# Patient Record
Sex: FEMALE | Race: WHITE | HISPANIC OR LATINO | Employment: OTHER | URBAN - METROPOLITAN AREA
[De-identification: names, ages, dates, MRNs, and addresses within clinical notes are randomized per-mention and may not be internally consistent; named-entity substitution may affect disease eponyms.]

---

## 2017-07-26 ENCOUNTER — ALLSCRIPTS OFFICE VISIT (OUTPATIENT)
Dept: OTHER | Facility: OTHER | Age: 72
End: 2017-07-26

## 2017-10-04 ENCOUNTER — ALLSCRIPTS OFFICE VISIT (OUTPATIENT)
Dept: OTHER | Facility: OTHER | Age: 72
End: 2017-10-04

## 2017-10-05 NOTE — PROGRESS NOTES
Assessment  1  Asymptomatic menopausal state (V49 81) (Z78 0)   2  Diabetes mellitus with neuropathy (250 60,357 2) (E11 40)   3  Callus (700) (L84)    Plan   · Follow-up PRN Evaluation and Treatment  Follow-up  Status: Complete  Done:  06LJG8278 03:08PM   · Follow-up visit in 9 weeks Evaluation and Treatment  Follow-up  Status: Hold For -  Scheduling  Requested for: 33SJF1673   · *VB - Foot Exam; Status:Complete;   Done: 89NGS4887 03:08PM    Discussion/Summary  The patient was counseled regarding instructions for management,-patient and family education,-importance of compliance with treatment  Patient is able to Self-Care  The treatment plan was reviewed with the patient/guardian  The patient/guardian understands and agrees with the treatment plan      Chief Complaint  FEET CARE      History of Present Illness  HPI: 69-year-old female with diabetes mellitus, is controlled with metformin and insulin, she complains of pain in her toes  Today  She has pain with ambulation and shoe wear  there is no history of trauma      Active Problems  1  Asymptomatic menopausal state (V49 81) (Z78 0)   2  Benign essential hypertension (401 1) (I10)   3  Callus (700) (L84)   4  Deformity of ankle and foot, acquired (736 70) (M21 969)   5  Diabetes mellitus with neuropathy (250 60,357 2) (E11 40)   6  Diabetic Peripheral Neuropathy (357 2)   7  Disc degeneration, lumbosacral (722 52) (M51 37)   8  Insomnia (780 52) (G47 00)   9  Limb pain (729 5) (M79 609)   10  Lumbago (724 2) (M54 5)   11  Lumbar canal stenosis (724 02) (M48 061)   12  Ovarian cyst (620 2) (N83 20)   13  Pes planus, unspecified laterality (734) (M21 40)   14  Prepatellar bursitis, unspecified laterality (726 65) (M70 40)   15  Routine history and physical examination of adult (V70 0) (Z00 00)   16  Type 2 diabetes mellitus (250 00) (E11 9)   17   Well adult on routine health check (V70 0) (Z00 00)    Social History   · Former smoker (I45 23) (P63 142)    Current Meds   1  Accu-Chek FastClix Lancets Miscellaneous; Therapy: 92Mea7569 to Recorded   2  Aspirin 81 MG Oral Tablet Chewable; Therapy: (Recorded:37Wpd2585) to Recorded   3  Aspirin 81 MG TBDP; 1 Every Day; Therapy: 78VCN2639 to  Requested for: 32HHN8887 Recorded   4  CeleBREX 200 MG Oral Capsule; 2 Every Morning; Therapy: 83VQR9370 to  Requested for: 86DZF8572 Recorded   5  HumuLIN 70/30 Pen (70-30) 100 UNIT/ML SUSP; Therapy: (Recorded:27Swf9735) to Recorded   6  HumuLIN N Pen 100 UNIT/ML SUPN;   Therapy: 13DWK4454 to Recorded   7  Ketorolac Tromethamine 0 4 % Ophthalmic Solution; Therapy: 01Apr2014 to Recorded   8  Latanoprost 0 005 % Ophthalmic Solution; Therapy: (Recorded:79Kfb2988) to Recorded   9  Lisinopril 10 MG Oral Tablet; 1 Every Day; Therapy: 18WLI2137 to  Requested for: 70WQA5349 Recorded   10  Lisinopril 5 MG Oral Tablet; Therapy: (Recorded:91Ton6382) to Recorded   11  Loratadine 10 MG Oral Tablet; 1 Every Day; Therapy: 39GGO2800 to  Requested for: 46DCA7508 Recorded   12  Lumigan 0 01 % Ophthalmic Solution; Therapy: 76OBJ2671 to Recorded   13  MetFORMIN HCl - 500 MG Oral Tablet; 2 Two Times A Day; Therapy: 30CLN7497 to  Requested for: 71RTV1231 Recorded   14  MetFORMIN HCl - 500 MG Oral Tablet; Therapy: (Recorded:04Bql4832) to Recorded   15  Metoprolol Tartrate 25 MG Oral Tablet; Therapy: (Recorded:67Vvt3969) to Recorded   16  Nabumetone 500 MG Oral Tablet; Therapy: (Recorded:82Cfc0304) to Recorded   17  Ofloxacin 0 3 % Ophthalmic Solution; Therapy: 01Apr2014 to Recorded   18  Omeprazole 20 MG Oral Capsule Delayed Release; Therapy: (Recorded:34Vuz6612) to Recorded   19  Prandin 1 MG Oral Tablet; 3 tabs qac tid; Therapy: 88FAL0145 to  Requested for: 08FVC0873 Recorded   20  Prandin 1 MG Oral Tablet; Therapy: (Recorded:67Pxl7536) to Recorded   21  PrednisoLONE Acetate 1 % Ophthalmic Suspension; Therapy: 01Apr2014 to Recorded   22   Simvastatin 10 MG Oral Tablet; Therapy: (Recorded:09Fgg3338) to Recorded   23  Simvastatin 40 MG Oral Tablet; 1/2 tab daily; Therapy: 36ODC8242 to  Requested for: 32PEL3943 Recorded   24  Toprol XL 25 MG Oral Tablet Extended Release 24 Hour; 1 Every Day; Therapy: 77BCD2749 to  Requested for: 34JLU8882 Recorded   25  Tradjenta 5 MG Oral Tablet; Therapy: (Recorded:05Joi4315) to Recorded   26  Xalatan 0 005 % Ophthalmic Solution; as directed; Therapy: 19FPF9526 to  Requested for: 34YZV8148 Recorded   27  Zolpidem Tartrate 10 MG Oral Tablet; 1 Every Day At Bedtime, As Needed; Therapy: 80LYX3718 to  Requested for: 59HTM2438 Recorded    Allergies  1  No Known Drug Allergies    Vitals   Recorded: 23VEY2653 02:55PM   Respiration 16   Systolic 961   Diastolic 67   Height 5 ft 3 in   Weight 120 lb    BMI Calculated 21 26   BSA Calculated 1 56     Physical Exam  Left Foot: Appearance: Normal except as noted: excessive pronation-and-pes planus  Great toe deformities include a bunion  Second toe deformities include hammer toe  Third toe deformities include hammer toe  Tenderness: None except the great toe-and-distal first metatarsal    Right Foot: Appearance: Normal except as noted: excessive pronation-and-pes planus  Second toe deformities include hammer toe  Third toe deformities include hammer toe  Tenderness: None except the great toe-and-distal first metatarsal    Left Ankle: ROM: limited ROM in all planes Motor: Normal    Right Ankle: ROM: limited ROM in all planes Motor: Normal    Neurological Exam: performed  Light touch was absent bilaterally  Vibratory sensation was absent bilaterally  Vascular Exam: performed Dorsalis pedis pulses were One/4 bilaterally  Posterior tibial pulses were One/4 bilaterally  Elevation Pallor: diminished bilaterally  Capillary refill time was Q  9, findings bilateral  Negative digital hair noted  Abnormal cooling noted, but-between 1-3 seconds bilaterally  Toenails:  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  Socks and shoes removed, Right Foot Findings: swollen, erythematous and dry  The sensory exam showed diminished vibratory sensation at the level of the toes  Diminished tactile sensation with monofilament testing throughout the right foot  Socks and shoes removed, Left Foot Findings: swollen, erythematous and dry  The sensory exam showed diminished vibratory sensation at the level of the toes  Diminished tactile sensation with monofilament testing throughout the left foot  Hyperkeratosis: present on both first toes-and-present on both first sub metatarsals  Shoe Gear Evaluation: performed ()  Right Foot: width: D -and-length: 8 5  Left Foot: width: D -and-length: A  point by  Recommendation(s): custom inlays  Procedure      Procedure: Debridement of hyperkeratosis  The procedure's risks were discussed with the patient  Procedure Note:  Anesthesia:   The area for debridement was located on the medial aspect of the Hallux pinch callus, bilateral distal phalanx  Debridement was performed on 4, partial thickness hyperkeratotic area(s)  Post-Procedure: the patient tolerated the procedure well  Complications: there were no complications  All mycotic nails debrided  Bilateral pre-ulcerative lesions debrided as well        Signatures   Electronically signed by : Ac Stone DPM; Oct  4 2017  3:08PM EST                       (Author)

## 2017-12-13 ENCOUNTER — ALLSCRIPTS OFFICE VISIT (OUTPATIENT)
Dept: OTHER | Facility: OTHER | Age: 72
End: 2017-12-13

## 2017-12-15 NOTE — PROGRESS NOTES
Assessment  1  Callus (700) (L84)   2  Diabetes mellitus with neuropathy (250 60,357 2) (E11 40)   3  Type 2 diabetes mellitus (250 00) (E11 9)   4  Diabetic neuropathy (250 60,357 2) (E11 40)    Plan   · Follow-up visit in 9 weeks Evaluation and Treatment  Follow-up  Status: Hold For -Scheduling  Requested for: 38Ydj4135   · *VB - Foot Exam; Status:Complete;   Done: 38PDE7879 01:35PM   · Diabetes Foot Exam Performed; Status:Complete;   Done: 51EEH9073 01:35PM   · Inspect your feet and legs daily if you have vascular disease ; Status:Complete;   Done:31Vxa4104 01:35PM   · Inspect your feet daily ; Status:Complete;   Done: 16FKL2059 01:35PM   · It is important to take good care of your feet if you have diabetes ; Status:Complete;  Done: 80TLW3395 01:35PM   · There are many things you can do at home to ensure good foot health ; Status:Complete;  Done: 73GFQ9720 01:35PM   · Wear shoes that give your toes plenty of room ; Status:Complete;   Done: 18Tzc462092:35PM    Discussion/Summary  The patient was counseled regarding instructions for management,-- patient and family education,-- importance of compliance with treatment  Patient is able to Self-Care  The treatment plan was reviewed with the patient/guardian  The patient/guardian understands and agrees with the treatment plan      Chief Complaint  FEET CARE      History of Present Illness  HPI: 80-year-old female with diabetes mellitus, is controlled with metformin and insulin, she complains of pain in her toes  Today  She has pain with ambulation and shoe wear  there is no history of trauma      Active Problems  1  Asymptomatic menopausal state (V49 81) (Z78 0)   2  Benign essential hypertension (401 1) (I10)   3  Callus (700) (L84)   4  Deformity of ankle and foot, acquired (736 70) (M21 969)   5  Diabetes mellitus with neuropathy (250 60,357 2) (E11 40)   6  Diabetic neuropathy (250 60,357 2) (E11 40)   7  Disc degeneration, lumbosacral (722 52) (M51 37)   8  Insomnia (780 52) (G47 00)   9  Limb pain (729 5) (M79 609)   10  Lumbago (724 2) (M54 5)   11  Lumbar canal stenosis (724 02) (M48 061)   12  Ovarian cyst (620 2) (N83 20)   13  Pes planus, unspecified laterality (734) (M21 40)   14  Prepatellar bursitis, unspecified laterality (726 65) (M70 40)   15  Routine history and physical examination of adult (V70 0) (Z00 00)   16  Type 2 diabetes mellitus (250 00) (E11 9)   17  Well adult on routine health check (V70 0) (Z00 00)    Social History   · Former smoker (Q69 95) (M83 413)    Current Meds   1  Accu-Chek FastClix Lancets Miscellaneous; Therapy: 46Cks1695 to Recorded   2  Aspirin 81 MG Oral Tablet Chewable; Therapy: (Recorded:26Byc2834) to Recorded   3  Aspirin 81 MG TBDP; 1 Every Day; Therapy: 79IZE6809 to  Requested for: 89OKV7631 Recorded   4  CeleBREX 200 MG Oral Capsule; 2 Every Morning; Therapy: 61ECL8849 to  Requested for: 18LYL5432 Recorded   5  HumuLIN 70/30 Pen (70-30) 100 UNIT/ML SUSP; Therapy: (Recorded:11Ktm8867) to Recorded   6  HumuLIN N Pen 100 UNIT/ML SUPN; Therapy: 79NUA8307 to Recorded   7  Ketorolac Tromethamine 0 4 % Ophthalmic Solution; Therapy: 84Vqg0431 to Recorded   8  Latanoprost 0 005 % Ophthalmic Solution; Therapy: (Recorded:01Rce1669) to Recorded   9  Lisinopril 10 MG Oral Tablet; 1 Every Day; Therapy: 69EQE4698 to  Requested for: 23DSY5992 Recorded   10  Lisinopril 5 MG Oral Tablet; Therapy: (Recorded:94Vdf1281) to Recorded   11  Loratadine 10 MG Oral Tablet; 1 Every Day; Therapy: 80SQI9889 to  Requested for: 67DGR0860 Recorded   12  Lumigan 0 01 % Ophthalmic Solution; Therapy: 53SFB5226 to Recorded   13  MetFORMIN HCl - 500 MG Oral Tablet; 2 Two Times A Day; Therapy: 08PEK2795 to  Requested for: 15AWO7077 Recorded   14  MetFORMIN HCl - 500 MG Oral Tablet; Therapy: (Recorded:30Aug2013) to Recorded   15  Metoprolol Tartrate 25 MG Oral Tablet; Therapy: (Recorded:30Aug2013) to Recorded   16  Nabumetone 500 MG Oral Tablet;   Therapy: (Recorded:41Xjm4909) to Recorded   17  Ofloxacin 0 3 % Ophthalmic Solution; Therapy: 01Apr2014 to Recorded   18  Omeprazole 20 MG Oral Capsule Delayed Release; Therapy: (Recorded:17Jcu0186) to Recorded   19  Prandin 1 MG Oral Tablet; 3 tabs qac tid; Therapy: 46VVF8016 to  Requested for: 11ENA6858 Recorded   20  Prandin 1 MG Oral Tablet; Therapy: (Recorded:15Ixv0769) to Recorded   21  PrednisoLONE Acetate 1 % Ophthalmic Suspension; Therapy: 01Apr2014 to Recorded   22  Simvastatin 10 MG Oral Tablet; Therapy: (Recorded:31Tld2838) to Recorded   23  Simvastatin 40 MG Oral Tablet; 1/2 tab daily; Therapy: 21MAF3565 to  Requested for: 55ZAH5320 Recorded   24  Toprol XL 25 MG Oral Tablet Extended Release 24 Hour; 1 Every Day; Therapy: 56UKX5772 to  Requested for: 31UJE8745 Recorded   25  Tradjenta 5 MG Oral Tablet; Therapy: (Recorded:00Bdz9193) to Recorded   26  Xalatan 0 005 % Ophthalmic Solution; as directed; Therapy: 44PHS9707 to  Requested for: 30NBZ4675 Recorded   27  Zolpidem Tartrate 10 MG Oral Tablet; 1 Every Day At Bedtime, As Needed; Therapy: 07QCK6061 to  Requested for: 21ZZI9197 Recorded    Allergies  1  No Known Drug Allergies    Vitals   Recorded: 46Ftz8857 01:17PM   Height 5 ft 3 in   Weight 120 lb    BMI Calculated 21 26   BSA Calculated 1 56     Physical Exam  Left Foot: Appearance: Normal except as noted: excessive pronation-- and-- pes planus  Great toe deformities include a bunion  Second toe deformities include hammer toe  Third toe deformities include hammer toe  Tenderness: None except the great toe-- and-- distal first metatarsal    Right Foot: Appearance: Normal except as noted: excessive pronation-- and-- pes planus  Second toe deformities include hammer toe  Third toe deformities include hammer toe   Tenderness: None except the great toe-- and-- distal first metatarsal    Left Ankle: ROM: limited ROM in all planes Motor: Normal    Right Ankle: ROM: limited ROM in all planes Motor: Normal  Neurological Exam: performed  Light touch was absent bilaterally  Vibratory sensation was absent bilaterally  Vascular Exam: performed Dorsalis pedis pulses were One/4 bilaterally  Posterior tibial pulses were One/4 bilaterally  Elevation Pallor: diminished bilaterally  Capillary refill time was Q  9, findings bilateral  Negative digital hair noted  Abnormal cooling noted, but-- between 1-3 seconds bilaterally  Toenails: 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  Socks and shoes removed, Right Foot Findings: swollen, erythematous and dry  The sensory exam showed diminished vibratory sensation at the level of the toes  Diminished tactile sensation with monofilament testing throughout the right foot  Socks and shoes removed, Left Foot Findings: swollen, erythematous and dry  The sensory exam showed diminished vibratory sensation at the level of the toes  Diminished tactile sensation with monofilament testing throughout the left foot  Hyperkeratosis: present on both first toes-- and-- present on both first sub metatarsals  Shoe Gear Evaluation: performed ()  Right Foot: width: D -- and-- length: 8 5  Left Foot: width: D -- and-- length: A  point by  Recommendation(s): custom inlays  Procedure     Procedure: Debridement of hyperkeratosis  The procedure's risks were discussed with the patient  Procedure Note: Anesthesia:  The area for debridement was located on the medial aspect of the Hallux pinch callus, bilateral distal phalanx  Debridement was performed on 4, partial thickness hyperkeratotic area(s)  Post-Procedure: the patient tolerated the procedure well  Complications: there were no complications  All mycotic nails debrided  Bilateral pre-ulcerative lesions debrided as well        Signatures   Electronically signed by : Deann Bloch, DPM; Dec 13 2017  1:35PM EST                       (Author)

## 2018-01-13 VITALS
HEIGHT: 63 IN | RESPIRATION RATE: 16 BRPM | WEIGHT: 120 LBS | BODY MASS INDEX: 21.26 KG/M2 | SYSTOLIC BLOOD PRESSURE: 132 MMHG | DIASTOLIC BLOOD PRESSURE: 67 MMHG

## 2018-01-14 VITALS
DIASTOLIC BLOOD PRESSURE: 63 MMHG | RESPIRATION RATE: 16 BRPM | WEIGHT: 130 LBS | HEIGHT: 63 IN | HEART RATE: 76 BPM | BODY MASS INDEX: 23.04 KG/M2 | SYSTOLIC BLOOD PRESSURE: 131 MMHG

## 2018-01-22 VITALS — WEIGHT: 120 LBS | HEIGHT: 63 IN | BODY MASS INDEX: 21.26 KG/M2

## 2018-10-16 ENCOUNTER — TRANSCRIBE ORDERS (OUTPATIENT)
Dept: ADMINISTRATIVE | Facility: HOSPITAL | Age: 73
End: 2018-10-16

## 2018-10-16 DIAGNOSIS — R09.89 ABNORMAL CHEST SOUNDS: Primary | ICD-10-CM

## 2018-10-22 ENCOUNTER — HOSPITAL ENCOUNTER (OUTPATIENT)
Dept: RADIOLOGY | Facility: HOSPITAL | Age: 73
Discharge: HOME | End: 2018-10-22
Payer: MEDICARE

## 2018-10-22 DIAGNOSIS — R09.89 ABNORMAL CHEST SOUNDS: ICD-10-CM

## 2018-10-22 PROCEDURE — 93880 EXTRACRANIAL BILAT STUDY: CPT

## 2018-10-23 PROCEDURE — 93880 EXTRACRANIAL BILAT STUDY: CPT | Performed by: SURGERY

## 2018-10-31 ENCOUNTER — OFFICE VISIT (OUTPATIENT)
Dept: VASCULAR SURGERY | Facility: CLINIC | Age: 73
End: 2018-10-31
Payer: MEDICARE

## 2018-10-31 VITALS
RESPIRATION RATE: 18 BRPM | TEMPERATURE: 97.7 F | HEIGHT: 63 IN | DIASTOLIC BLOOD PRESSURE: 64 MMHG | WEIGHT: 140 LBS | SYSTOLIC BLOOD PRESSURE: 126 MMHG | HEART RATE: 78 BPM | BODY MASS INDEX: 24.8 KG/M2

## 2018-10-31 DIAGNOSIS — I65.23 CAROTID ARTERY STENOSIS, ASYMPTOMATIC, BILATERAL: Primary | ICD-10-CM

## 2018-10-31 DIAGNOSIS — R42 DIZZINESS: ICD-10-CM

## 2018-10-31 PROCEDURE — 99204 OFFICE O/P NEW MOD 45 MIN: CPT | Performed by: PHYSICIAN ASSISTANT

## 2018-10-31 RX ORDER — GLIPIZIDE 5 MG/1
TABLET ORAL
COMMUNITY
End: 2019-06-03

## 2018-10-31 RX ORDER — LATANOPROST 50 UG/ML
SOLUTION/ DROPS OPHTHALMIC
COMMUNITY
End: 2018-10-31 | Stop reason: ALTCHOICE

## 2018-10-31 RX ORDER — KETOROLAC TROMETHAMINE 4 MG/ML
SOLUTION/ DROPS OPHTHALMIC
COMMUNITY
Start: 2014-04-01 | End: 2018-10-31 | Stop reason: ALTCHOICE

## 2018-10-31 RX ORDER — LORATADINE 10 MG/1
TABLET ORAL DAILY
COMMUNITY
Start: 2007-10-30 | End: 2019-06-03

## 2018-10-31 RX ORDER — ZOLPIDEM TARTRATE 10 MG/1
TABLET ORAL
COMMUNITY
Start: 2008-01-23 | End: 2018-10-31 | Stop reason: ALTCHOICE

## 2018-10-31 RX ORDER — ASPIRIN 81 MG/1
TABLET, CHEWABLE ORAL
COMMUNITY

## 2018-10-31 RX ORDER — CELECOXIB 200 MG/1
CAPSULE ORAL
COMMUNITY
Start: 2007-03-15 | End: 2018-10-31 | Stop reason: ALTCHOICE

## 2018-10-31 RX ORDER — LISINOPRIL 10 MG/1
TABLET ORAL DAILY
COMMUNITY
Start: 2007-03-15 | End: 2019-06-03

## 2018-10-31 RX ORDER — NABUMETONE 500 MG/1
TABLET, FILM COATED ORAL
COMMUNITY
End: 2019-06-03

## 2018-10-31 RX ORDER — REPAGLINIDE 1 MG/1
TABLET ORAL
COMMUNITY
End: 2018-10-31 | Stop reason: ALTCHOICE

## 2018-10-31 RX ORDER — SIMVASTATIN 10 MG
TABLET ORAL
COMMUNITY
End: 2019-06-03

## 2018-10-31 RX ORDER — OMEPRAZOLE 20 MG/1
CAPSULE, DELAYED RELEASE ORAL
COMMUNITY
End: 2019-06-03

## 2018-10-31 NOTE — LETTER
October 31, 2018     [facility], [address] [address] [address]    Patient: Dasha Waddell   YOB: 1945   Date of Visit: 10/31/2018     Dear Dr Heaven Valentin      Thank you for referring Kamala Lewis to me for evaluation  Below are the relevant portions of my assessment and plan of care  If you have questions, please do not hesitate to call me  I look forward to following [facility] along with you  Sincerely,        Mariajose Estrada PA-C        CC: DO Aayush Costa MD    Progress Notes:    Assessment/Plan:    Carotid artery stenosis, asymptomatic, bilateral  Dasha Waddell 66 y/o F DM2 with neuropathy, HTN, HLD who is referred for evaluation of carotid artery disease  She was concerned about her risk of stroke and wanted to have carotid duplex performed  She also has had some dizziness and risk factors for cardiovascular disease  [facility] tells me that her DM, BP and lipids are well controlled on medical therapy  She has no history in our system, but she brings in recent BW from 8/2018 with A1C 7 1 and lipids with LDL 74  Her BP today is good  She does not smoke cigarettes  [facility] has no history of known cardiac disease  No HX or symptoms of TIA or stroke  Exam:  No carotid bruits appreciated; good upstroke  R 2+ DP, L 1+ DP; feet are warm and well-perfused; motor-sensory intact; R/L biphasic AT/DP/PT    VAS Carotid du 10/22/2018  ISHMAEL < 50%; 87/21; plaque heterogeneous/smooth; vertebral antegrade; no SC disease  LICA < 55%; 09/15;  plaque heterogeneous/smooth; vertebral antegrade; no SC disease    Discussion:  - [facility] has no high risk features for complaints of dizziness  Her carotid duplex shows very mild disease and it is unlikely etiology of symptoms  Further, the vertebral circulation was noted to be antegrade  If further symptoms of dizziness occur, consider arrhythmia work up (doubt arrhythmia)       - We reviewed her history, recent labs and carotid duplex  [facility] Suarez was given a copy of her duplex study which shows only mild carotid disease  Carotid can be repeated in a couple of years and if stable and be followed every few years unless clinical change  - Patient education regarding symptoms of stroke was provided    - Continue good medical therapy with aspirin, statin and ACE inhibitor and RF modification for co-morbidities  - Consider stress test for evaluation of dyspnea in diabetic patient and to provide additional reassurance for her  Subjective:      Patient ID: Linh Vidal is a 67 y o  female  Patient is new to our Practice referred by Dr Elihue Harada  Patient had CV study on 10/22/2018  She has dizziness  HPI     Linh Vidal 68 y/o F DM2 with neuropathy, HTN, HLD who is referred for evaluation of carotid artery disease  She was concerned about her risk of stroke and wanted to have carotid duplex performed  She also has had some dizziness  [facility] has risk factors for cardiovascular disease as listed above (DM, HTN, HLP) which she tells me are well controlled on medical therapy  She has no history in our system, but she brings in recent BW from 8/2018 with A1C 7 1 and lipids with LDL 74  Her BP today is good  She does not smoke cigarettes  [facility] has no history of known cardiac disease  No HX or symptoms of TIA or stroke  Joseph Suarez tells me that she had a nuclear stress test performed about 5 years ago with some abnormality but she does not know what it was and the records are not available to me  In any case, she see cardiology and no further testing was required  She also had a history of "extra beats" for which she is taking metoprolol  She remains asymptomatic  No palpitations  No known hx AF  Joseph Suarez reports that on occasion when going to the bathroom at night, she experiences brief lightheadedness when she stands up too quickly    She understands that she should sit and stand up slowly to allow time to equilibrate  Also, she is concerned for a couple of episodes of dizziness, "spinning" while laying in bed which are likely benign and can be watched  She had no associated chest pain, SOB or palpitations  No visual changes  No dizziness turning head  No HX near syncope/syncope  No balance problems  She remains very active, but has noticed shortness of breath on exertion  She describes symptoms of dyspnea at higher level of activity at about 1 mile of activity or 45 minutes which she feels is a change and not normal  I asked her to discuss this with her doctors as it might be reasonable to check a stress test in diabetic female who hasn't had one in about 5 years  No family history of premature coronary artery disease or SCD       Lipids  H68 Tg 146 L74    VAS Carotid du 10/22/2018  ISHMAEL < 50%; 87/21; plaque heterogeneous/smooth; vertebral antegrade; no SC disease  LICA < 09%; 28/13;  plaque heterogeneous/smooth; vertebral antegrade; no SC disease

## 2018-10-31 NOTE — ASSESSMENT & PLAN NOTE
Ian Cason 66 y/o F DM2 with neuropathy, HTN, HLD who is referred for evaluation of carotid artery disease  She was concerned about her risk of stroke and wanted to have carotid duplex performed  She also has had some dizziness and risk factors for cardiovascular disease  [facility] tells me that her DM, BP and lipids are well controlled on medical therapy  She has no history in our system, but she brings in recent BW from 8/2018 with A1C 7 1 and lipids with LDL 74  Her BP today is good  She does not smoke cigarettes  [facility] has no history of known cardiac disease  No HX or symptoms of TIA or stroke  Exam:  No carotid bruits appreciated; good upstroke  R 2+ DP, L 1+ DP; feet are warm and well-perfused; motor-sensory intact; R/L biphasic AT/DP/PT    VAS Carotid du 10/22/2018  ISHMAEL < 50%; 87/21; plaque heterogeneous/smooth; vertebral antegrade; no SC disease  LICA < 76%; 00/53;  plaque heterogeneous/smooth; vertebral antegrade; no SC disease    Discussion:  - [facility] has no high risk features for complaints of dizziness  Her carotid duplex shows very mild disease and it is unlikely etiology of symptoms  Further, the vertebral circulation was noted to be antegrade  If further symptoms of dizziness occur, consider arrhythmia work up (doubt arrhythmia)  - We reviewed her history, recent labs and carotid duplex  Linda Alcocer was given a copy of her duplex study which shows only mild carotid disease  Carotid can be repeated in a couple of years and if stable and be followed every few years unless clinical change  - Patient education regarding symptoms of stroke was provided    - Continue good medical therapy with aspirin, statin and ACE inhibitor and RF modification for co-morbidities  - Consider stress test for evaluation of dyspnea in diabetic patient and to provide additional reassurance for her

## 2018-10-31 NOTE — PATIENT INSTRUCTIONS
Carotid artery disease, asymptomatic (mild)  Dizziness   Shortness of breath on exertion    Dasha Waddell 68 y/o F DM2 with neuropathy, HTN, HLD who is referred for evaluation of carotid arteries  [facility] has risk factors for cardiovascular disease as listed above which are well controlled on medical therapy  She does not smoke cigarettes  She has no history or symptoms of TIA or stroke  [facility] reports that on occasion when going to the bathroom at night, she experiences brief lightheadedness when she stands up too quickly  She understands that she should sit and stand up slowly to allow time to equilibrate  Also, she had a couple episodes dizziness, "spinning "while laying in bed" which are likely benign and can be watched  [facility] remains very active  She has noticed shortness of breath on exertion  She describes symptoms of dyspnea at higher level of activity at about 1 mile of activity  I asked her to discuss this with her doctors as it might be reasonable to check a stress test in diabetic who hasn't had one in about 5 years  Discussion:    [facility] has no high risk features for dizziness  Her carotid disease is unlikely etiology of dizziness  Further, the vertebral circulation was noted to be antegrade  We reviewed her history, recent labs and carotid duplex  [facility] was given a copy of her duplex study which shows only mild carotid disease  The carotid duplex study can be repeated in a couple of years  Patient education regarding stroke symptoms  Continue good medical therapy with aspirin, statin and ACE inhibitor  Patient Education on carotid artery disease  Carotid artery disease increases a persons risk of having a stroke  There are plaques/ fatty deposits that build up in the carotid arteries  There are two main blood vessels bring blood to the brain  When plaque forms in those vessels,  the arteries can become narrow       Symptoms of stroke:  - Unable to speak or understand speech  -unable to move one side of the body (arm or leg)  -visual changes    Healthy Lifestyle changes  -Stay active with regular activity  -Maintain a healthy weight  -Eat a heart-healthy, diabetic diet  -Maintain good blood pressure

## 2018-10-31 NOTE — PROGRESS NOTES
Assessment/Plan:    Carotid artery stenosis, asymptomatic, bilateral  Ian Cason 66 y/o F DM2 with neuropathy, HTN, HLD who is referred for evaluation of carotid artery disease  She was concerned about her risk of stroke and wanted to have carotid duplex performed  She also has had some dizziness and risk factors for cardiovascular disease  Linda Alcocer tells me that her DM, BP and lipids are well controlled on medical therapy  She has no history in our system, but she brings in recent BW from 8/2018 with A1C 7 1 and lipids with LDL 74  Her BP today is good  She does not smoke cigarettes  Linda Alcocer has no history of known cardiac disease  No HX or symptoms of TIA or stroke  Exam:  No carotid bruits appreciated; good upstroke  R 2+ DP, L 1+ DP; feet are warm and well-perfused; motor-sensory intact; R/L biphasic AT/DP/PT    VAS Carotid du 10/22/2018  ISHMAEL < 50%; 87/21; plaque heterogeneous/smooth; vertebral antegrade; no SC disease  LICA < 70%; 27/92;  plaque heterogeneous/smooth; vertebral antegrade; no SC disease    Discussion:  - Linda Alcocer has no high risk features for complaints of dizziness  Her carotid duplex shows very mild disease and it is unlikely etiology of symptoms  Further, the vertebral circulation was noted to be antegrade  If further symptoms of dizziness occur, consider arrhythmia work up (doubt arrhythmia)  - We reviewed her history, recent labs and carotid duplex  Linda Alcocer was given a copy of her duplex study which shows only mild carotid disease  Carotid can be repeated in a couple of years and if stable and be followed every few years unless clinical change  - Patient education regarding symptoms of stroke was provided    - Continue good medical therapy with aspirin, statin and ACE inhibitor and RF modification for co-morbidities  - Consider stress test for evaluation of dyspnea in diabetic patient and to provide additional reassurance for her        Dizziness  -  As discussed under carotid disease    Subjective:      Patient ID: Tresa Lara is a 67 y o  female  Patient is new to our Practice referred by Dr Rosezetta Boas  Patient had CV study on 10/22/2018  She has dizziness with laying and when standing  HPI     Tresa Lara 68 y/o F DM2 with neuropathy, HTN, HLD who is referred for evaluation of carotid artery disease  She was concerned about her risk of stroke and wanted to have carotid duplex performed  She also has had some dizziness  Jose Liuz has risk factors for cardiovascular disease as listed above (DM, HTN, HLP) which she tells me are well controlled on medical therapy  She has no history in our system, but she brings in recent BW from 8/2018 with A1C 7 1 and lipids with LDL 74  Her BP today is good  She does not smoke cigarettes  Jose Porras has no history of known cardiac disease  No HX or symptoms of TIA or stroke  Jose Liuz tells me that she had a  Nuclear stress test performed about 5 years ago with some abnormality but she does not know what it was and the records are not available to me  In any case, she see cardiology and no further testing was required  She also had a history of "extra beats" for which she is taking metoprolol  She remains asymptomatic  No palpitations  Jose Liuz reports that on occasion when going to the bathroom at night, she experiences brief lightheadedness when she stands up too quickly  She understands  that she should sit and stand up slowly to allow time to equilibrate  Also, she is concerned for a couple of episodes of dizziness, "spinning" while laying in bed which are likely benign and can be watched  She was not associated chest pain, SOB or palpitations  No visual changes  No dizziness turning head  No HX near syncope/syncope  No balance problems  She remains very active, but has noticed shortness of breath on exertion   She describes symptoms of dyspnea at higher level of activity at about 1 mile of activity or 45 minutes which she feels is a change and not normal  I asked her to discuss this with her doctors as it might be reasonable to check a stress test in diabetic female who hasn't had one in about 5 years  No family history of premature coronary artery disease or SCD  Lipids  H68 Tg 146 L74    VAS Carotid du 10/22/2018  ISHMAEL < 50%; 87/21; plaque heterogeneous/smooth; vertebral antegrade; no SC disease  LICA < 66%; 51/22;  plaque heterogeneous/smooth; vertebral antegrade; no SC disease    The following portions of the patient's history were reviewed and updated as appropriate: allergies, current medications, past family history, past medical history, past social history, past surgical history and problem list     No Chest pain  No palpitations  No syncope  No history of leg pain or claudication    Review of Systems   Constitutional: Negative  HENT: Negative  Eyes: Negative  Respiratory: Negative  Cardiovascular: Negative  Gastrointestinal: Negative  Endocrine: Negative  Genitourinary: Negative  Musculoskeletal: Negative  Skin: Negative  Allergic/Immunologic: Negative  Neurological: Positive for dizziness  Hematological: Negative  Psychiatric/Behavioral: Negative  Objective:    /64 (BP Location: Right arm)   Pulse 78   Temp 97 7 °F (36 5 °C)   Resp 18   Ht 5' 3" (1 6 m)   Wt 63 5 kg (140 lb)   BMI 24 80 kg/m²      Physical Exam   Constitutional: She is oriented to person, place, and time  She appears well-developed and well-nourished  She is cooperative  HENT:   Head: Normocephalic and atraumatic  Eyes: Pupils are equal, round, and reactive to light  EOM are normal    Neck: Trachea normal  Neck supple  No JVD present  No thyromegaly present  Cardiovascular: Normal rate, S1 normal, S2 normal and normal heart sounds  Exam reveals no gallop and no friction rub  No murmur heard  Pulses:       Carotid pulses are 2+ on the right side, and 2+ on the left side  Radial pulses are 2+ on the right side, and 2+ on the left side  Femoral pulses are 2+ on the right side  Dorsalis pedis pulses are 2+ on the right side, and 1+ on the left side  Overall regular, rare extra beat    No carotid bruit appreciated; No nystagmus    R 2+ DP, L 1+ DP; feet are warm and well-perfused; motor-sensory intact    R/L biphasic AT/DP/PT   Pulmonary/Chest: Effort normal and breath sounds normal  No accessory muscle usage  No respiratory distress  She has no wheezes  She has no rales  Abdominal: Soft  Bowel sounds are normal  She exhibits no distension  There is no hepatosplenomegaly  There is no tenderness  Musculoskeletal: Normal range of motion  She exhibits no edema or deformity  Neurological: She is alert and oriented to person, place, and time  Grossly normal    Skin: Skin is warm and dry  No lesion and no rash noted  No cyanosis  Nails show no clubbing  Psychiatric: She has a normal mood and affect  Nursing note and vitals reviewed  Vitals:    10/31/18 1545 10/31/18 1547   BP: 122/64 126/64   BP Location: Left arm Right arm   Patient Position: Sitting    Pulse: 78    Resp: 18    Temp:  97 7 °F (36 5 °C)   Weight: 63 5 kg (140 lb)    Height: 5' 3" (1 6 m)        Patient Active Problem List   Diagnosis    Carotid artery stenosis, asymptomatic, bilateral    Dizziness       History reviewed  No pertinent surgical history  History reviewed  No pertinent family history  Social History     Social History    Marital status: Unknown     Spouse name: N/A    Number of children: N/A    Years of education: N/A     Occupational History    Not on file       Social History Main Topics    Smoking status: Former Smoker    Smokeless tobacco: Never Used      Comment: quit 40 years ago    Alcohol use No    Drug use: No    Sexual activity: Not on file     Other Topics Concern    Not on file     Social History Narrative    No narrative on file       No Known Allergies      Current Outpatient Prescriptions:     aspirin 81 mg chewable tablet, Chew, Disp: , Rfl:     bimatoprost (LUMIGAN) 0 01 % ophthalmic drops, Apply to eye, Disp: , Rfl:     Calcium Carbonate (CALTRATE 600) 1500 (600 Ca) MG TABS, Take by mouth, Disp: , Rfl:     cyanocobalamin ([employer] VITAMIN B-12) 1000 MCG tablet, Take by mouth, Disp: , Rfl:     glipiZIDE (GLUCOTROL) 5 mg tablet, Take by mouth, Disp: , Rfl:     insulin lispro (HUMALOG KWIKPEN) 100 units/mL injection pen, Inject under the skin, Disp: , Rfl:     Linagliptin (TRADJENTA) 5 MG TABS, Take by mouth, Disp: , Rfl:     lisinopril (ZESTRIL) 10 mg tablet, Take by mouth daily, Disp: , Rfl:     loratadine (CLARITIN) 10 mg tablet, Take by mouth daily, Disp: , Rfl:     metFORMIN (GLUCOPHAGE) 500 mg tablet, Take by mouth 2 (two) times a day, Disp: , Rfl:     metoprolol tartrate (LOPRESSOR) 25 mg tablet, Take by mouth, Disp: , Rfl:     nabumetone (RELAFEN) 500 mg tablet, Take by mouth, Disp: , Rfl:     omeprazole (PriLOSEC) 20 mg delayed release capsule, Take by mouth, Disp: , Rfl:     simvastatin (ZOCOR) 10 mg tablet, Take by mouth, Disp: , Rfl:

## 2019-02-08 ENCOUNTER — OFFICE VISIT (OUTPATIENT)
Dept: AUDIOLOGY | Facility: CLINIC | Age: 74
End: 2019-02-08
Payer: MEDICARE

## 2019-02-08 DIAGNOSIS — R42 DIZZINESS AND GIDDINESS: Primary | ICD-10-CM

## 2019-02-08 PROCEDURE — 92537 CALORIC VSTBLR TEST W/REC: CPT | Performed by: AUDIOLOGIST

## 2019-02-08 PROCEDURE — 92567 TYMPANOMETRY: CPT | Performed by: AUDIOLOGIST

## 2019-02-08 PROCEDURE — 92540 BASIC VESTIBULAR EVALUATION: CPT | Performed by: AUDIOLOGIST

## 2019-02-08 NOTE — PROGRESS NOTES
Videonystagmography (VNG) Evaluation    Name:  Jeff Flaherty  :  1945  Age:  68 y o  Date of Evaluation: 19     History: Dizziness  Reason for visit: Jeff Flaherty is seen today at the request of Dr Farhana Hines for an evaluation of balance  Patient reports than in November, she woke up one morning feeling dizzy or as if she was moving  She had a few more episodes of a similar nature, during which her blood sugar read slightly high (patient has been diabetic for 30+ years)  Patient reports she has also had episodes of waking up during the night feeling dizzy and lightheaded  In December, the patient was riding in a car on the way home from [place] during which she felt dizzy and lightheaded the entire time, and during stops her  had to help her remain on her feet  She reports no prior history of motion sickness  The patient denies headache, changes in vision or changes in hearing during her episodes of imbalance and lightheadedness  She reports an ultrasound of her carotid arteries in December revealed small amounts of plaque          Results:  Mixed    Tympanometry:   Right: Type As - reduced compliance   Left: Type As - reduced compliance    Oculomotor battery:    Gaze:          Right: Normal        Left: Normal         Up: Normal        Down: Normal      Saccades: Abnormal Velocity     Tracking: Abnormal Gain left cycle 0 2-0 4 Hz, right cycle 0 2 Hz     Optokinetic: Abnormal Gain    Positioning/Positionals:     Leandrew Loron:    Right: 3 degreees LBN only upon sitting up, subjective dizziness reported    Left: Negative        Positionals:     Sitting: Normal    Supine: Normal    Head Right: Normal    Head Left: Normal    Body Right: Abnormal Left Beat 3 degree and Clinically Not Significant   (ageotropic)    Body Left[de-identified] Abnormal Right Beat 3 degree and Clinically Not Significant   (ageotropic)      Calorics:     Bithermal Caloric Irrigation: Abnormal Unilateral Weakness Left 57%      Recommendations: Consider vestibular physcial therapy evaluation and rehabilitation through [facility]'s Physical Therapy  Follow up with managing physician        Torie Ladd, Audiology Intern  Toya Stein , [address]  Clinical Audiologist

## 2019-02-08 NOTE — LETTER
2019     Charmaine Robbins, [address]  [address] [place] [address]    Patient: Eveline Durbin   YOB: 1945   Date of Visit: 2019       Dear Dr Emy Olivier: Thank you for referring Santos Mcelroy to me for evaluation  Below are my notes for this consultation  If you have questions, please do not hesitate to call me  I look forward to following your patient along with you  Sincerely,        Lasha Kirkland, Audiology Toya Watts , [facility]  Clinical Audiologist        CC: Koreen Lundborg, MD Rafael Range  2019  4:47 PM  Sign at close encounter  Videonystagmography (VNG) Evaluation    Name:  Eveline Durbin  :  1945  Age:  68 y o  Date of Evaluation: 19     History: Dizziness  Reason for visit: Eveline Durbin is seen today at the request of Dr Vicente Arellano for an evaluation of balance  Patient reports than in November, she woke up one morning feeling dizzy or as if she was moving  She had a few more episodes of a similar nature, during which her blood sugar read slightly high (patient has been diabetic for 30+ years)  Patient reports she has also had episodes of waking up during the night feeling dizzy and lightheaded  In December, the patient was riding in a car on the way home from [place] during which she felt dizzy and lightheaded the entire time, and during stops her  had to help her remain on her feet  She reports no prior history of motion sickness  The patient denies headache, changes in vision or changes in hearing during her episodes of imbalance and lightheadedness  She reports an ultrasound of her carotid arteries in December revealed small amounts of plaque          Results:  Mixed    Tympanometry:   Right: Type As - reduced compliance   Left: Type As - reduced compliance    Oculomotor battery:    Gaze:          Right: Normal        Left: Normal         Up: Normal        Down: Normal      Saccades: Abnormal Velocity     Tracking: Abnormal Gain left cycle 0 2-0 4 Hz, right cycle 0 2 Hz     Optokinetic: Abnormal Gain    Positioning/Positionals:     Soraida Yee:    Right: 3 degreees LBN only upon sitting up, subjective dizziness reported    Left: Negative        Positionals:     Sitting: Normal    Supine: Normal    Head Right: Normal    Head Left: Normal    Body Right: Abnormal Left Beat 3 degree and Clinically Not Significant   (ageotropic)    Body Left[de-identified] Abnormal Right Beat 3 degree and Clinically Not Significant   (ageotropic)      Calorics:     Bithermal Caloric Irrigation: Abnormal Unilateral Weakness Left  57%      Recommendations: Consider vestibular physcial therapy evaluation and rehabilitation through [facility] Physical Therapy  Follow up with managing physician        Leonardo Corey, Audiology Intern  Toya Reyes , [address]  Clinical Audiologist

## 2019-02-08 NOTE — LETTER
2019     Justin Callaway  [address] [address]    Patient: Justin Callaway   YOB: 1945   Date of Visit: 2019       Dear Ms Owen:    Attached  are my notes related to your VNG testing at [address] [place] [address]  [employer]  If you have questions, please do not hesitate to call me  Sincerely,        Fabricio Adams        CC: No Recipients  Fabricio Adams  2019  4:50 PM  Signed  Videonystagmography (VNG) Evaluation    Name:  Justin Callaway  :  1945  Age:  68 y o  Date of Evaluation: 19     History: Dizziness  Reason for visit: Justin Callaway is seen today at the request of Dr Maia Villar for an evaluation of balance  Patient reports than in November, she woke up one morning feeling dizzy or as if she was moving  She had a few more episodes of a similar nature, during which her blood sugar read slightly high (patient has been diabetic for 30+ years)  Patient reports she has also had episodes of waking up during the night feeling dizzy and lightheaded  In December, the patient was riding in a car on the way home from [place] during which she felt dizzy and lightheaded the entire time, and during stops her  had to help her remain on her feet  She reports no prior history of motion sickness  The patient denies headache, changes in vision or changes in hearing during her episodes of imbalance and lightheadedness  She reports an ultrasound of her carotid arteries in December revealed small amounts of plaque          Results:  Mixed    Tympanometry:   Right: Type As - reduced compliance   Left: Type As - reduced compliance    Oculomotor battery:    Gaze:          Right: Normal        Left: Normal         Up: Normal        Down: Normal      Saccades: Abnormal Velocity     Tracking: Abnormal Gain left cycle 0 2-0 4 Hz, right cycle 0 2 Hz     Optokinetic: Abnormal Gain    Positioning/Positionals:     Maribell Rivers:    Right: 3 degreees LBN only upon sitting up, subjective dizziness reported    Left: Negative        Positionals:     Sitting: Normal    Supine: Normal    Head Right: Normal    Head Left: Normal    Body Right: Abnormal Left Beat 3 degree and Clinically Not Significant   (ageotropic)    Body Left[de-identified] Abnormal Right Beat 3 degree and Clinically Not Significant   (ageotropic)      Calorics:     Bithermal Caloric Irrigation: Abnormal Unilateral Weakness Left  57%      Recommendations: Consider vestibular physcial therapy evaluation and rehabilitation through [address]'s Physical Therapy  Follow up with managing physician        Zunilda Sandhu, Audiology Intern  Toya Crane , [address] [facility]  Clinical Audiologist

## 2019-03-18 LAB
CREAT ?TM UR-SCNC: 67 UMOL/L
EXT MICROALBUMIN URINE RANDOM: 74.1
HBA1C MFR BLD HPLC: 7.2 %
MICROALBUMIN/CREAT UR: 1106 MG/G{CREAT}

## 2019-04-07 ENCOUNTER — HOSPITAL ENCOUNTER (EMERGENCY)
Facility: HOSPITAL | Age: 74
Discharge: HOME | End: 2019-04-07
Attending: EMERGENCY MEDICINE | Admitting: EMERGENCY MEDICINE
Payer: MEDICARE

## 2019-04-07 VITALS
OXYGEN SATURATION: 100 % | BODY MASS INDEX: 23.39 KG/M2 | HEART RATE: 87 BPM | TEMPERATURE: 97.8 F | DIASTOLIC BLOOD PRESSURE: 80 MMHG | WEIGHT: 132 LBS | SYSTOLIC BLOOD PRESSURE: 169 MMHG | HEIGHT: 63 IN | RESPIRATION RATE: 18 BRPM

## 2019-04-07 DIAGNOSIS — S01.81XA FACIAL LACERATION, INITIAL ENCOUNTER: ICD-10-CM
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
## 2019-04-07 PROCEDURE — 99283 EMERGENCY DEPT VISIT LOW MDM: CPT

## 2019-04-07 PROCEDURE — 90471 IMMUNIZATION ADMIN: CPT

## 2019-04-07 PROCEDURE — 90715 TDAP VACCINE 7 YRS/> IM: CPT | Performed by: EMERGENCY MEDICINE

## 2019-04-07 RX ORDER — LIDOCAINE HYDROCHLORIDE AND EPINEPHRINE 10; 10 MG/ML; UG/ML
1 INJECTION, SOLUTION INFILTRATION; PERINEURAL ONCE
Status: COMPLETED | OUTPATIENT
Start: 2019-04-07 | End: 2019-04-07

## 2019-04-07 RX ORDER — GINSENG 100 MG
1 CAPSULE ORAL ONCE
Status: COMPLETED | OUTPATIENT
Start: 2019-04-07 | End: 2019-04-07

## 2019-04-07 RX ADMIN — LIDOCAINE HYDROCHLORIDE,EPINEPHRINE BITARTRATE 1 ML: 10; .01 INJECTION, SOLUTION INFILTRATION; PERINEURAL at 17:15

## 2019-04-07 RX ADMIN — BACITRACIN ZINC 1 SMALL APPLICATION: 500 OINTMENT TOPICAL at 17:29

## 2019-04-07 RX ADMIN — TETANUS TOXOID, REDUCED DIPHTHERIA TOXOID AND ACELLULAR PERTUSSIS VACCINE, ADSORBED 0.5 ML: 5; 2.5; 8; 8; 2.5 SUSPENSION INTRAMUSCULAR at 17:30

## 2019-06-03 ENCOUNTER — CONSULT (OUTPATIENT)
Dept: NEPHROLOGY | Facility: CLINIC | Age: 74
End: 2019-06-03
Payer: MEDICARE

## 2019-06-03 VITALS
DIASTOLIC BLOOD PRESSURE: 62 MMHG | SYSTOLIC BLOOD PRESSURE: 140 MMHG | BODY MASS INDEX: 22.08 KG/M2 | WEIGHT: 124.6 LBS | RESPIRATION RATE: 16 BRPM | HEIGHT: 63 IN | HEART RATE: 94 BPM

## 2019-06-03 DIAGNOSIS — N17.9 AKI (ACUTE KIDNEY INJURY) (HCC): ICD-10-CM

## 2019-06-03 DIAGNOSIS — N18.30 CKD (CHRONIC KIDNEY DISEASE), STAGE III (HCC): ICD-10-CM

## 2019-06-03 DIAGNOSIS — R80.9 PROTEINURIA, UNSPECIFIED TYPE: ICD-10-CM

## 2019-06-03 DIAGNOSIS — R42 DIZZINESS: ICD-10-CM

## 2019-06-03 DIAGNOSIS — I10 BENIGN ESSENTIAL HTN: ICD-10-CM

## 2019-06-03 DIAGNOSIS — I65.23 CAROTID ARTERY STENOSIS, ASYMPTOMATIC, BILATERAL: Primary | ICD-10-CM

## 2019-06-03 LAB
SL AMB  POCT GLUCOSE, UA: 100
SL AMB LEUKOCYTE ESTERASE,UA: ABNORMAL
SL AMB POCT BILIRUBIN,UA: ABNORMAL
SL AMB POCT BLOOD,UA: ABNORMAL
SL AMB POCT CLARITY,UA: CLEAR
SL AMB POCT COLOR,UA: YELLOW
SL AMB POCT KETONES,UA: ABNORMAL
SL AMB POCT NITRITE,UA: ABNORMAL
SL AMB POCT PH,UA: 6.5
SL AMB POCT SPECIFIC GRAVITY,UA: 1.01
SL AMB POCT URINE PROTEIN: 15
SL AMB POCT UROBILINOGEN: ABNORMAL

## 2019-06-03 PROCEDURE — 81002 URINALYSIS NONAUTO W/O SCOPE: CPT | Performed by: INTERNAL MEDICINE

## 2019-06-03 PROCEDURE — 99204 OFFICE O/P NEW MOD 45 MIN: CPT | Performed by: INTERNAL MEDICINE

## 2019-06-03 RX ORDER — NITROGLYCERIN 2.5 MG/D
1 PATCH TRANSDERMAL DAILY
COMMUNITY
End: 2021-07-19

## 2019-06-03 RX ORDER — RANOLAZINE 500 MG/1
1000 TABLET, EXTENDED RELEASE ORAL 2 TIMES DAILY
COMMUNITY

## 2019-06-03 RX ORDER — PANTOPRAZOLE SODIUM 40 MG/1
40 TABLET, DELAYED RELEASE ORAL DAILY
COMMUNITY

## 2019-06-03 RX ORDER — GLIMEPIRIDE 4 MG/1
1 TABLET ORAL
COMMUNITY

## 2019-06-03 RX ORDER — ATORVASTATIN CALCIUM 40 MG/1
40 TABLET, FILM COATED ORAL DAILY
COMMUNITY

## 2019-06-03 RX ORDER — CLOPIDOGREL BISULFATE 75 MG/1
75 TABLET ORAL DAILY
COMMUNITY

## 2019-06-03 RX ORDER — REPAGLINIDE 1 MG/1
1 TABLET ORAL
COMMUNITY

## 2019-06-05 ENCOUNTER — HOSPITAL ENCOUNTER (OUTPATIENT)
Dept: RADIOLOGY | Facility: HOSPITAL | Age: 74
Discharge: HOME | End: 2019-06-05
Attending: INTERNAL MEDICINE
Payer: MEDICARE

## 2019-06-05 DIAGNOSIS — I10 BENIGN ESSENTIAL HTN: ICD-10-CM

## 2019-06-05 DIAGNOSIS — N17.9 AKI (ACUTE KIDNEY INJURY) (HCC): ICD-10-CM

## 2019-06-05 DIAGNOSIS — R80.9 PROTEINURIA, UNSPECIFIED TYPE: ICD-10-CM

## 2019-06-05 PROCEDURE — 76770 US EXAM ABDO BACK WALL COMP: CPT

## 2019-06-11 ENCOUNTER — TELEPHONE (OUTPATIENT)
Dept: NEPHROLOGY | Facility: CLINIC | Age: 74
End: 2019-06-11

## 2019-06-14 LAB
CREAT ?TM UR-SCNC: 78 UMOL/L
EXT MICROALBUMIN URINE RANDOM: 0.6
HBA1C MFR BLD HPLC: 7.2 %
MICROALBUMIN/CREAT UR: 8 MG/G{CREAT}

## 2019-06-19 LAB
ALBUMIN ?TM MFR UR ELPH: 55 %
ALBUMIN SERPL ELPH-MCNC: 3.8 G/DL (ref 3.8–4.8)
ALBUMIN SERPL-MCNC: 4.2 G/DL (ref 3.6–5.1)
ALBUMIN/GLOB SERPL: 2 (CALC) (ref 1–2.5)
ALP SERPL-CCNC: 76 U/L (ref 33–130)
ALPHA1 GLOB ?TM MFR UR ELPH: 0 %
ALPHA1 GLOB SERPL ELPH-MCNC: 0.3 G/DL (ref 0.2–0.3)
ALPHA2 GLOB ?TM MFR UR ELPH: 0 %
ALPHA2 GLOB SERPL ELPH-MCNC: 0.9 G/DL (ref 0.5–0.9)
ALT SERPL-CCNC: 14 U/L (ref 6–29)
APPEARANCE UR: CLEAR
AST SERPL-CCNC: 19 U/L (ref 10–35)
B-GLOBULIN ?TM MFR UR ELPH: 28 %
BACTERIA UR QL AUTO: NORMAL /HPF
BASOPHILS # BLD AUTO: 52 CELLS/UL (ref 0–200)
BASOPHILS NFR BLD AUTO: 1.2 %
BETA1 GLOB SERPL ELPH-MCNC: 0.5 G/DL (ref 0.4–0.6)
BETA2 GLOB SERPL ELPH-MCNC: 0.3 G/DL (ref 0.2–0.5)
BILIRUB SERPL-MCNC: 0.4 MG/DL (ref 0.2–1.2)
BILIRUB UR QL STRIP: NEGATIVE
BUN SERPL-MCNC: 15 MG/DL (ref 7–25)
BUN/CREAT SERPL: 15 (CALC) (ref 6–22)
C3 SERPL-MCNC: 145 MG/DL (ref 83–193)
C4 SERPL-MCNC: 51 MG/DL (ref 15–57)
CALCIUM SERPL-MCNC: 9.8 MG/DL (ref 8.6–10.4)
CHLORIDE SERPL-SCNC: 101 MMOL/L (ref 98–110)
CO2 SERPL-SCNC: 32 MMOL/L (ref 20–32)
COLOR UR: YELLOW
CREAT SERPL-MCNC: 0.97 MG/DL (ref 0.6–0.93)
CREAT UR-MCNC: 77 MG/DL (ref 20–275)
EOSINOPHIL # BLD AUTO: 9 CELLS/UL (ref 15–500)
EOSINOPHIL NFR BLD AUTO: 0.2 %
ERYTHROCYTE [DISTWIDTH] IN BLOOD BY AUTOMATED COUNT: 12.2 % (ref 11–15)
GAMMA GLOB ?TM MFR UR ELPH: 17 %
GAMMA GLOB SERPL ELPH-MCNC: 0.6 G/DL (ref 0.8–1.7)
GLOBULIN SER CALC-MCNC: 2.1 G/DL (CALC) (ref 1.9–3.7)
GLUCOSE SERPL-MCNC: 164 MG/DL (ref 65–99)
GLUCOSE UR QL STRIP: NEGATIVE
HCT VFR BLD AUTO: 32.6 % (ref 35–45)
HGB BLD-MCNC: 11.2 G/DL (ref 11.7–15.5)
HGB UR QL STRIP: NEGATIVE
HYALINE CASTS #/AREA URNS LPF: NORMAL /LPF
KAPPA LC FREE SER-MCNC: 15.3 MG/L (ref 3.3–19.4)
KAPPA LC FREE/LAMBDA FREE SER: 1.42 {RATIO} (ref 0.26–1.65)
KETONES UR QL STRIP: NEGATIVE
LAMBDA LC FREE SERPL-MCNC: 10.8 MG/L (ref 5.7–26.3)
LEUKOCYTE ESTERASE UR QL STRIP: NEGATIVE
LYMPHOCYTES # BLD AUTO: 606 CELLS/UL (ref 850–3900)
LYMPHOCYTES NFR BLD AUTO: 14.1 %
MCH RBC QN AUTO: 33.1 PG (ref 27–33)
MCHC RBC AUTO-ENTMCNC: 34.4 G/DL (ref 32–36)
MCV RBC AUTO: 96.4 FL (ref 80–100)
MONOCYTES # BLD AUTO: 366 CELLS/UL (ref 200–950)
MONOCYTES NFR BLD AUTO: 8.5 %
NEUTROPHILS # BLD AUTO: 3268 CELLS/UL (ref 1500–7800)
NEUTROPHILS NFR BLD AUTO: 76 %
NITRITE UR QL STRIP: NEGATIVE
PH UR STRIP: 5.5 [PH] (ref 5–8)
PLATELET # BLD AUTO: 399 THOUSAND/UL (ref 140–400)
PMV BLD REES-ECKER: 9.3 FL (ref 7.5–12.5)
POTASSIUM SERPL-SCNC: 4.6 MMOL/L (ref 3.5–5.3)
PROT SERPL-MCNC: 6.3 G/DL (ref 6.1–8.1)
PROT SERPL-MCNC: 6.3 G/DL (ref 6.1–8.1)
PROT UR QL STRIP: NEGATIVE
PROT UR-MCNC: 7 MG/DL (ref 5–24)
PROT/CREAT UR: 91 MG/G CREAT (ref 21–161)
RBC # BLD AUTO: 3.38 MILLION/UL (ref 3.8–5.1)
RBC #/AREA URNS HPF: NORMAL /HPF
SL AMB EGFR AFRICAN AMERICAN: 67 ML/MIN/1.73M2
SL AMB EGFR NON AFRICAN AMERICAN: 58 ML/MIN/1.73M2
SL AMB INTERPRETATION: NORMAL
SODIUM SERPL-SCNC: 137 MMOL/L (ref 135–146)
SP GR UR STRIP: 1.01 (ref 1–1.03)
SQUAMOUS #/AREA URNS HPF: NORMAL /HPF
WBC # BLD AUTO: 4.3 THOUSAND/UL (ref 3.8–10.8)
WBC #/AREA URNS HPF: NORMAL /HPF

## 2019-07-01 ENCOUNTER — OFFICE VISIT (OUTPATIENT)
Dept: NEPHROLOGY | Facility: CLINIC | Age: 74
End: 2019-07-01
Payer: MEDICARE

## 2019-07-01 VITALS
HEIGHT: 63 IN | HEART RATE: 80 BPM | WEIGHT: 122.8 LBS | SYSTOLIC BLOOD PRESSURE: 112 MMHG | BODY MASS INDEX: 21.76 KG/M2 | RESPIRATION RATE: 18 BRPM | DIASTOLIC BLOOD PRESSURE: 58 MMHG

## 2019-07-01 DIAGNOSIS — R80.9 PROTEINURIA, UNSPECIFIED TYPE: Primary | ICD-10-CM

## 2019-07-01 DIAGNOSIS — N18.1 CKD (CHRONIC KIDNEY DISEASE), STAGE I: ICD-10-CM

## 2019-07-01 DIAGNOSIS — D64.9 ANEMIA, UNSPECIFIED TYPE: ICD-10-CM

## 2019-07-01 PROCEDURE — 99214 OFFICE O/P EST MOD 30 MIN: CPT | Performed by: INTERNAL MEDICINE

## 2019-07-01 NOTE — PATIENT INSTRUCTIONS
1) Avoid NSAIDS - (Example - motrin, advil, ibuprofen, aleve, exederin, etc)  2) Always follow a low salt diet  3) no changes to your medications  4) labwork in 4 months, appt thereafter

## 2019-07-01 NOTE — PROGRESS NOTES
NEPHROLOGY OFFICE VISIT   Donna Bowers 68 y o  female MRN: [id number]  7/1/2019    Reason for Visit: CKD     ASSESSMENT and PLAN:    I had the pleasure of seeing Ms Dia Templeton today in the renal clinic for the continued management of CKD II  Since our last visit, there has been no ER visits or hospitalizations  He currently has no complaints at this time and is feeling well  Patient denies any chest pain, shortness of breath and swelling  The last blood work was done on june, which we have reviewed together  69 yo female with PMHx of DM (diagnosed 32 years ago), no retinopathy, HTN (diagnosed 32 years ago), HPL, CAD status post PCI in April, patient appears to have had 4 cardiac catheterizations in the last 2 or 3 months, ejection fraction improved to 60% in May, vasovagal syncope in May, former smoker quit 46 years ago (smoked for 10 years), who presents for follow up evaluation for proteinuria, and CKD     1) 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 with recent SCOTT     Patient may have had acute kidney injury in the setting of four cardiac catheterizations over the last 3 months      -creatinine 0 8-1 mg/dL in April 2019  -creatinine on 5/9/2019 was 1 27 per dL  -repeat creatinine on 05/23 was 1 09 mg/dL  -microalbumin to creatinine ratio 1106 mg/g from 10 mg/g in 2018  - UA with hematuria and pyuria in march 2019 and was treated for UTI at that time (UA bland in 2018)  - A1c 7 2% in march 2019  -urine sediment in the office today with small protein  Otherwise bland  Urine sediment was squamous epithelial cells otherwise no other cells noted    - renal u/s from 6/5/19 - right kidney 9 3 cm, left kidney 9 7 cm, bilateral kidneys are lobulated diffusely, right kidney possible 3 mm calculus, left kidney simple cyst   -repeat renal ultrasound in June 2020  -SPEP-hypogammaglobulinemia  -UPEP-no monoclonal band (but states glomerular pattern, though mainly albumin)  -U PCR 91 milligram/gram on recheck in june  - UA bland  - C3, C4 nl  - kappa/lamda ratio nl  - Cr 0 97 with stable electrolytes in June    - overall, it appears pt renal function has returned to baseline 1 mg/dL; SPEP, UPEP were listed as glomerular pattern, but K/L ratio nl, UPCR improved, and Cr stable  - therefore, will repeat labwork in 3-4 months along with SPEP, UPEP      2) Hyperkalemia - given chronic issue, it is likely due to impaired urinary potassium excretion      -potassium 5 1 on 05/09 but as high as 6 in April  -potassium level has improved starting in May to normal levels  - pt appears to have had hyperkalemia since 2015  -concern for RTA  -may consider low-dose thiazide diuretic if needed     3) Anemia-hemoglobin stable 11 2     4) UTI in March     -recently treated for UTI for Citrobacter with macrobid in March     5) HTN - controlled     -monitor with metoprolol for now     6) acid/base     - recheck bicarb stable     7) microalbuminuria     - check UPCR also  - may be in setting of SCOTT/DM  - recheck as above     It was a pleasure evaluating your patient in the office today  Thank you for allowing our team to participate in the care of Ms Kirk Hargrove  Please do not hesitate to contact our team if further issues/questions shall arise in the interim         No problem-specific Assessment & Plan notes found for this encounter  HPI:    Pt denies complaints  Denies n/v  PATIENT INSTRUCTIONS:    Patient Instructions   1) Avoid NSAIDS - (Example - motrin, advil, ibuprofen, aleve, exederin, etc)  2) Always follow a low salt diet  3) no changes to your medications  4) labwork in 4 months, appt thereafter        OBJECTIVE:  Current Weight: Weight - Scale: 55 7 kg (122 lb 12 8 oz)  Vitals:    07/01/19 1131   BP: 112/58   BP Location: Left arm   Patient Position: Sitting   Cuff Size: Standard   Pulse: 80   Resp: 18   Weight: 55 7 kg (122 lb 12 8 oz)   Height: 5' 3" (1 6 m)    Body mass index is 21 75 kg/m²        REVIEW OF SYSTEMS:    Review of Systems   Constitutional: Negative  Negative for fatigue  HENT: Negative  Eyes: Negative  Respiratory: Negative  Negative for shortness of breath  Cardiovascular: Negative  Negative for leg swelling  Gastrointestinal: Negative  Endocrine: Negative  Genitourinary: Negative  Negative for difficulty urinating  Musculoskeletal: Negative  Skin: Negative  Allergic/Immunologic: Negative  Neurological: Negative  Hematological: Negative  Psychiatric/Behavioral: Negative  All other systems reviewed and are negative  PHYSICAL EXAM:      Physical Exam   Constitutional: She is oriented to person, place, and time  She appears well-developed and well-nourished  No distress  HENT:   Head: Normocephalic and atraumatic  Mouth/Throat: No oropharyngeal exudate  Eyes: Conjunctivae are normal  Right eye exhibits no discharge  Left eye exhibits no discharge  No scleral icterus  Neck: Normal range of motion  Neck supple  No JVD present  Cardiovascular: Normal rate and regular rhythm  Exam reveals no gallop and no friction rub  No murmur heard  Pulmonary/Chest: Effort normal and breath sounds normal  No respiratory distress  She has no wheezes  She has no rales  Abdominal: Soft  Bowel sounds are normal  She exhibits no distension  There is no tenderness  There is no rebound  Musculoskeletal: Normal range of motion  She exhibits no edema, tenderness or deformity  Neurological: She is alert and oriented to person, place, and time  Coordination normal    Skin: Skin is warm and dry  No rash noted  She is not diaphoretic  No erythema  No pallor  Psychiatric: She has a normal mood and affect  Her behavior is normal  Judgment and thought content normal    Nursing note and vitals reviewed        Medications:    Current Outpatient Medications:     atorvastatin (LIPITOR) 40 mg tablet, Take 40 mg by mouth daily, Disp: , Rfl:     bimatoprost (LUMIGAN) 0 01 % ophthalmic drops, Apply to eye, Disp: , Rfl:     clopidogrel (PLAVIX) 75 mg tablet, Take 75 mg by mouth daily, Disp: , Rfl:     glimepiride (AMARYL) 4 mg tablet, Take 4 mg by mouth every morning before breakfast, Disp: , Rfl:     Linagliptin (TRADJENTA) 5 MG TABS, Take by mouth, Disp: , Rfl:     metoprolol tartrate (LOPRESSOR) 25 mg tablet, Take 12 5 mg by mouth daily , Disp: , Rfl:     nitroglycerin (NITRODUR) 0 1 mg/hr, Place 1 patch on the skin daily, Disp: , Rfl:     pantoprazole (PROTONIX) 40 mg tablet, Take 40 mg by mouth daily, Disp: , Rfl:     ranolazine (RANEXA) 500 mg 12 hr tablet, Take 500 mg by mouth 2 (two) times a day, Disp: , Rfl:     repaglinide (PRANDIN) 1 mg tablet, Take 1 mg by mouth 3 (three) times a day before meals, Disp: , Rfl:     aspirin 81 mg chewable tablet, Chew, Disp: , Rfl:     Laboratory Results:        Invalid input(s): ALBUMIN    Results for orders placed or performed in visit on 06/14/19   Protein electrophoresis, serum   Result Value Ref Range    Protein, Total 6 3 6 1 - 8 1 g/dL    Albumin, Electrophoresis 3 8 3 8 - 4 8 g/dL    Alpha-1 Globulin 0 3 0 2 - 0 3 g/dL    Alpha-2 Globulin 0 9 0 5 - 0 9 g/dL    Beta 1 Globulin 0 5 0 4 - 0 6 g/dL    Beta 2 Globulin 0 3 0 2 - 0 5 g/dL    Gamma Globulin 0 6 (L) 0 8 - 1 7 g/dL    Interpretation     PROTEIN, TOTAL AND PROTEIN ELECTROPHORESIS, RANDOM URINE   Result Value Ref Range    Creatinine, Urine 77 20 - 275 mg/dL    Protein/Creat Ratio 91 21 - 161 mg/g creat    Total Protein, Urine 7 5 - 24 mg/dL    Albumin 55 %    Alpha-1-Globulins 0 %    Alpha-2-Globulins 0 %    Beta Globulins 28 %    Gamma Globulins 17 %    Interpretation:          Consistent with glomerular pattern      Comprehensive metabolic panel   Result Value Ref Range    Glucose, Random 164 (H) 65 - 99 mg/dL    BUN 15 7 - 25 mg/dL    Creatinine 0 97 (H) 0 60 - 0 93 mg/dL    eGFR Non  58 (L) > OR = 60 mL/min/1 73m2    eGFR  67 > OR = 60 mL/min/1 73m2    SL AMB BUN/CREATININE RATIO 15 6 - 22 (calc)    Sodium 137 135 - 146 mmol/L    Potassium 4 6 3 5 - 5 3 mmol/L    Chloride 101 98 - 110 mmol/L    CO2 32 20 - 32 mmol/L    SL AMB CALCIUM 9 8 8 6 - 10 4 mg/dL    Protein, Total 6 3 6 1 - 8 1 g/dL    Albumin 4 2 3 6 - 5 1 g/dL    Globulin 2 1 1 9 - 3 7 g/dL (calc)    Albumin/Globulin Ratio 2 0 1 0 - 2 5 (calc)    TOTAL BILIRUBIN 0 4 0 2 - 1 2 mg/dL    Alkaline Phosphatase 76 33 - 130 U/L    AST 19 10 - 35 U/L    ALT 14 6 - 29 U/L   Urinalysis with microscopic   Result Value Ref Range    Color UA YELLOW YELLOW    Urine Appearance CLEAR CLEAR    Specific Gravity 1 011 1 001 - 1 035    Ph 5 5 5 0 - 8 0    Glucose, Urine NEGATIVE NEGATIVE    Bilirubin, Urine NEGATIVE NEGATIVE    Ketone, Urine NEGATIVE NEGATIVE    Blood, Urine NEGATIVE NEGATIVE    Protein, Urine NEGATIVE NEGATIVE    SL AMB NITRITES URINE, QUAL   NEGATIVE NEGATIVE    Leukocyte Esterase NEGATIVE NEGATIVE    SL AMB WBC, URINE NONE SEEN < OR = 5 /HPF    RBC, Urine NONE SEEN < OR = 2 /HPF    Squamous Epithelial Cells NONE SEEN < OR = 5 /HPF    Bacteria, UA NONE SEEN NONE SEEN /HPF    Hyaline Casts NONE SEEN NONE SEEN /LPF   CBC and differential   Result Value Ref Range    White Blood Cell Count 4 3 3 8 - 10 8 Thousand/uL    Red Blood Cell Count 3 38 (L) 3 80 - 5 10 Million/uL    Hemoglobin 11 2 (L) 11 7 - 15 5 g/dL    HCT 32 6 (L) 35 0 - 45 0 %    MCV 96 4 80 0 - 100 0 fL    MCH 33 1 (H) 27 0 - 33 0 pg    MCHC 34 4 32 0 - 36 0 g/dL    RDW 12 2 11 0 - 15 0 %    Platelet Count 786 388 - 400 Thousand/uL    SL AMB MPV 9 3 7 5 - 12 5 fL    Neutrophils (Absolute) 3,268 1,500 - 7,800 cells/uL    Lymphocytes (Absolute) 606 (L) 850 - 3,900 cells/uL    Monocytes (Absolute) 366 200 - 950 cells/uL    Eosinophils (Absolute) 9 (L) 15 - 500 cells/uL    Basophils ABS 52 0 - 200 cells/uL    Neutrophils 76 %    Lymphocytes 14 1 %    Monocytes 8 5 %    Eosinophils 0 2 %    Basophils PCT 1 2 %   C3 complement   Result Value Ref Range    C3 Complement 145 83 - 193 mg/dL   C4 complement   Result Value Ref Range    C4, COMPLEMENT 51 15 - 57 mg/dL   Kappa/Lambda Light Chains Free With Ratio, Serum   Result Value Ref Range    Ig Kappa Free Light Chain 15 3 3 3 - 19 4 mg/L    Ig Lambda Free Light Chain 10 8 5 7 - 26 3 mg/L    Kappa/Lambda FluidC Ratio 1 42 0 26 - 1 65

## 2019-07-01 NOTE — LETTER
July 1, 2019     Junior [place], [address] [address]    Patient: Anthony Byrd   YOB: 1945   Date of Visit: 7/1/2019       Dear Dr Wing Matt: Thank you for referring Torie Zavala to me for evaluation  Below are my notes for this consultation  If you have questions, please do not hesitate to call me  I look forward to following your patient along with you  Sincerely,        Yonatan Edgar MD        CC: No Recipients  Yonatan Edgar MD  7/1/2019 12:24 PM  Sign at close encounter  [address] 68 y o  female MRN: [id number]  7/1/2019    Reason for Visit: CKD     ASSESSMENT and PLAN:    I had the pleasure of seeing Ms Betsey Anaya today in the renal clinic for the continued management of CKD II  Since our last visit, there has been no ER visits or hospitalizations  He currently has no complaints at this time and is feeling well  Patient denies any chest pain, shortness of breath and swelling  The last blood work was done on june, which we have reviewed together  69 yo female with PMHx of DM (diagnosed 32 years ago), no retinopathy, HTN (diagnosed 32 years ago), HPL, CAD status post PCI in April, patient appears to have had 4 cardiac catheterizations in the last 2 or 3 months, ejection fraction improved to 60% in May, vasovagal syncope in May, former smoker quit 46 years ago (smoked for 10 years), who presents for follow up evaluation for proteinuria, and CKD        1)  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 with recent SCOTT     Patient may have had acute kidney injury in the setting of four cardiac catheterizations over the last 3 months      -creatinine 0 8-1 mg/dL in April 2019  -creatinine on 5/9/2019 was 1 27 per dL  -repeat creatinine on 05/23 was 1 09 mg/dL  -microalbumin to creatinine ratio 1106 mg/g from 10 mg/g in 2018  - UA with hematuria and pyuria in march 2019 and was treated for UTI at that time (UA bland in 2018)  - A1c 7 2% in march 2019  -urine sediment in the office today with small protein  Otherwise bland  Urine sediment was squamous epithelial cells otherwise no other cells noted  - renal u/s from 6/5/19 - right kidney 9 3 cm, left kidney 9 7 cm, bilateral kidneys are lobulated diffusely, right kidney possible 3 mm calculus, left kidney simple cyst   -repeat renal ultrasound in June 2020  -SPEP-hypogammaglobulinemia  -UPEP-no monoclonal band (but states glomerular pattern, though mainly albumin)  -U PCR 91 milligram/gram on recheck in june  - UA bland  - C3, C4 nl  - kappa/lamda ratio nl  - Cr 0 97 with stable electrolytes in June    - overall, it appears pt renal function has returned to baseline 1 mg/dL; SPEP, UPEP were listed as glomerular pattern, but K/L ratio nl, UPCR improved, and Cr stable  - therefore, will repeat labwork in 3-4 months along with SPEP, UPEP      2) Hyperkalemia - given chronic issue, it is likely due to impaired urinary potassium excretion      -potassium 5 1 on 05/09 but as high as 6 in April  -potassium level has improved starting in May to normal levels  - pt appears to have had hyperkalemia since 2015  -concern for RTA  -may consider low-dose thiazide diuretic if needed     3) Anemia-hemoglobin  stable 11 2     4) UTI in March     -recently treated for UTI for Citrobacter with macrobid in March     5) HTN - controlled     -monitor with metoprolol for now     6) acid/base     - recheck bicarb stable     7) microalbuminuria     - check UPCR also  - may be in setting of SCOTT/DM  - recheck as above     It was a pleasure evaluating your patient in the office today  Thank you for allowing our team to participate in the care of Ms Khari Conway  Please do not hesitate to contact our team if further issues/questions shall arise in the interim         No problem-specific Assessment & Plan notes found for this encounter  HPI:    Pt denies complaints  Denies n/v       PATIENT INSTRUCTIONS:    Patient Instructions   1) Avoid NSAIDS - (Example - motrin, advil, ibuprofen, aleve, exederin, etc)  2) Always follow a low salt diet  3) no changes to your medications  4) labwork in 4 months, appt thereafter        OBJECTIVE:  Current Weight: Weight - Scale: 55 7 kg (122 lb 12 8 oz)  Vitals:    07/01/19 1131   BP: 112/58   BP Location: Left arm   Patient Position: Sitting   Cuff Size: Standard   Pulse: 80   Resp: 18   Weight: 55 7 kg (122 lb 12 8 oz)   Height: 5' 3" (1 6 m)    Body mass index is 21 75 kg/m²  REVIEW OF SYSTEMS:    Review of Systems   Constitutional: Negative  Negative for fatigue  HENT: Negative  Eyes: Negative  Respiratory: Negative  Negative for shortness of breath  Cardiovascular: Negative  Negative for leg swelling  Gastrointestinal: Negative  Endocrine: Negative  Genitourinary: Negative  Negative for difficulty urinating  Musculoskeletal: Negative  Skin: Negative  Allergic/Immunologic: Negative  Neurological: Negative  Hematological: Negative  Psychiatric/Behavioral: Negative  All other systems reviewed and are negative  PHYSICAL EXAM:      Physical Exam   Constitutional: She is oriented to person, place, and time  She appears well-developed and well-nourished  No distress  HENT:   Head: Normocephalic and atraumatic  Mouth/Throat: No oropharyngeal exudate  Eyes: Conjunctivae are normal  Right eye exhibits no discharge  Left eye exhibits no discharge  No scleral icterus  Neck: Normal range of motion  Neck supple  No JVD present  Cardiovascular: Normal rate and regular rhythm  Exam reveals no gallop and no friction rub  No murmur heard  Pulmonary/Chest: Effort normal and breath sounds normal  No respiratory distress  She has no wheezes  She has no rales  Abdominal: Soft  Bowel sounds are normal  She exhibits no distension  There is no tenderness  There is no rebound  Musculoskeletal: Normal range of motion  She exhibits no edema, tenderness or deformity  Neurological: She is alert and oriented to person, place, and time  Coordination normal    Skin: Skin is warm and dry  No rash noted  She is not diaphoretic  No erythema  No pallor  Psychiatric: She has a normal mood and affect  Her behavior is normal  Judgment and thought content normal    Nursing note and vitals reviewed        Medications:    Current Outpatient Medications:     atorvastatin (LIPITOR) 40 mg tablet, Take 40 mg by mouth daily, Disp: , Rfl:     bimatoprost (LUMIGAN) 0 01 % ophthalmic drops, Apply to eye, Disp: , Rfl:     clopidogrel (PLAVIX) 75 mg tablet, Take 75 mg by mouth daily, Disp: , Rfl:     glimepiride (AMARYL) 4 mg tablet, Take 4 mg by mouth every morning before breakfast, Disp: , Rfl:     Linagliptin (TRADJENTA) 5 MG TABS, Take by mouth, Disp: , Rfl:     metoprolol tartrate (LOPRESSOR) 25 mg tablet, Take 12 5 mg by mouth daily , Disp: , Rfl:     nitroglycerin (NITRODUR) 0 1 mg/hr, Place 1 patch on the skin daily, Disp: , Rfl:     pantoprazole (PROTONIX) 40 mg tablet, Take 40 mg by mouth daily, Disp: , Rfl:     ranolazine (RANEXA) 500 mg 12 hr tablet, Take 500 mg by mouth 2 (two) times a day, Disp: , Rfl:     repaglinide (PRANDIN) 1 mg tablet, Take 1 mg by mouth 3 (three) times a day before meals, Disp: , Rfl:     aspirin 81 mg chewable tablet, Chew, Disp: , Rfl:     Laboratory Results:        Invalid input(s): ALBUMIN    Results for orders placed or performed in visit on 06/14/19   Protein electrophoresis, serum   Result Value Ref Range    Protein, Total 6 3 6 1 - 8 1 g/dL    Albumin, Electrophoresis 3 8 3 8 - 4 8 g/dL    Alpha-1 Globulin 0 3 0 2 - 0 3 g/dL    Alpha-2 Globulin 0 9 0 5 - 0 9 g/dL    Beta 1 Globulin 0 5 0 4 - 0 6 g/dL    Beta 2 Globulin 0 3 0 2 - 0 5 g/dL    Gamma Globulin 0 6 (L) 0 8 - 1 7 g/dL    Interpretation     PROTEIN, TOTAL AND PROTEIN ELECTROPHORESIS, RANDOM URINE   Result Value Ref Range    Creatinine, Urine 77 20 - 275 mg/dL    Protein/Creat Ratio 91 21 - 161 mg/g creat    Total Protein, Urine 7 5 - 24 mg/dL    Albumin 55 %    Alpha-1-Globulins 0 %    Alpha-2-Globulins 0 %    Beta Globulins 28 %    Gamma Globulins 17 %    Interpretation:          Consistent with glomerular pattern  Comprehensive metabolic panel   Result Value Ref Range    Glucose, Random 164 (H) 65 - 99 mg/dL    BUN 15 7 - 25 mg/dL    Creatinine 0 97 (H) 0 60 - 0 93 mg/dL    eGFR Non  58 (L) > OR = 60 mL/min/1 73m2    eGFR  67 > OR = 60 mL/min/1 73m2    SL AMB BUN/CREATININE RATIO 15 6 - 22 (calc)    Sodium 137 135 - 146 mmol/L    Potassium 4 6 3 5 - 5 3 mmol/L    Chloride 101 98 - 110 mmol/L    CO2 32 20 - 32 mmol/L    SL AMB CALCIUM 9 8 8 6 - 10 4 mg/dL    Protein, Total 6 3 6 1 - 8 1 g/dL    Albumin 4 2 3 6 - 5 1 g/dL    Globulin 2 1 1 9 - 3 7 g/dL (calc)    Albumin/Globulin Ratio 2 0 1 0 - 2 5 (calc)    TOTAL BILIRUBIN 0 4 0 2 - 1 2 mg/dL    Alkaline Phosphatase 76 33 - 130 U/L    AST 19 10 - 35 U/L    ALT 14 6 - 29 U/L   Urinalysis with microscopic   Result Value Ref Range    Color UA YELLOW YELLOW    Urine Appearance CLEAR CLEAR    Specific Gravity 1 011 1 001 - 1 035    Ph 5 5 5 0 - 8 0    Glucose, Urine NEGATIVE NEGATIVE    Bilirubin, Urine NEGATIVE NEGATIVE    Ketone, Urine NEGATIVE NEGATIVE    Blood, Urine NEGATIVE NEGATIVE    Protein, Urine NEGATIVE NEGATIVE    SL AMB NITRITES URINE, QUAL   NEGATIVE NEGATIVE    Leukocyte Esterase NEGATIVE NEGATIVE    SL AMB WBC, URINE NONE SEEN < OR = 5 /HPF    RBC, Urine NONE SEEN < OR = 2 /HPF    Squamous Epithelial Cells NONE SEEN < OR = 5 /HPF    Bacteria, UA NONE SEEN NONE SEEN /HPF    Hyaline Casts NONE SEEN NONE SEEN /LPF   CBC and differential   Result Value Ref Range    White Blood Cell Count 4 3 3 8 - 10 8 Thousand/uL    Red Blood Cell Count 3 38 (L) 3 80 - 5 10 Million/uL    Hemoglobin 11 2 (L) 11 7 - 15 5 g/dL    HCT 32 6 (L) 35 0 - 45 0 %    MCV 96 4 80 0 - 100 0 fL    MCH 33 1 (H) 27 0 - 33 0 pg    MCHC 34 4 32 0 - 36 0 g/dL    RDW 12 2 11 0 - 15 0 %    Platelet Count 424 477 - 400 Thousand/uL    SL AMB MPV 9 3 7 5 - 12 5 fL    Neutrophils (Absolute) 3,268 1,500 - 7,800 cells/uL    Lymphocytes (Absolute) 606 (L) 850 - 3,900 cells/uL    Monocytes (Absolute) 366 200 - 950 cells/uL    Eosinophils (Absolute) 9 (L) 15 - 500 cells/uL    Basophils ABS 52 0 - 200 cells/uL    Neutrophils 76 %    Lymphocytes 14 1 %    Monocytes 8 5 %    Eosinophils 0 2 %    Basophils PCT 1 2 %   C3 complement   Result Value Ref Range    C3 Complement 145 83 - 193 mg/dL   C4 complement   Result Value Ref Range    C4, COMPLEMENT 51 15 - 57 mg/dL   Kappa/Lambda Light Chains Free With Ratio, Serum   Result Value Ref Range    Ig Kappa Free Light Chain 15 3 3 3 - 19 4 mg/L    Ig Lambda Free Light Chain 10 8 5 7 - 26 3 mg/L    Kappa/Lambda FluidC Ratio 1 42 0 26 - 1 65

## 2019-09-18 ENCOUNTER — TELEPHONE (OUTPATIENT)
Dept: NEPHROLOGY | Facility: CLINIC | Age: 74
End: 2019-09-18

## 2019-09-18 NOTE — TELEPHONE ENCOUNTER
I called patient to schedule November follow up with Dr Elizabeth Muhammad in the [facility]  She stated she will call us back to schedule

## 2019-10-16 LAB
CREAT ?TM UR-SCNC: 69 UMOL/L
EXT MICROALBUMIN URINE RANDOM: 0.7
HBA1C MFR BLD HPLC: 7.8 %
MICROALBUMIN/CREAT UR: 10 MG/G{CREAT}

## 2019-10-18 LAB
ALBUMIN SERPL ELPH-MCNC: 4.7 G/DL (ref 3.8–4.8)
ALPHA1 GLOB SERPL ELPH-MCNC: 0.3 G/DL (ref 0.2–0.3)
ALPHA2 GLOB SERPL ELPH-MCNC: 0.9 G/DL (ref 0.5–0.9)
BETA1 GLOB SERPL ELPH-MCNC: 0.4 G/DL (ref 0.4–0.6)
BETA2 GLOB SERPL ELPH-MCNC: 0.3 G/DL (ref 0.2–0.5)
CALCIUM SERPL-MCNC: 9.8 MG/DL (ref 8.6–10.4)
CREAT UR-MCNC: 70 MG/DL (ref 20–275)
GAMMA GLOB SERPL ELPH-MCNC: 0.7 G/DL (ref 0.8–1.7)
PHOSPHATE SERPL-MCNC: 4.6 MG/DL (ref 2.1–4.3)
PROT SERPL-MCNC: 7.3 G/DL (ref 6.1–8.1)
PROT UR-MCNC: 9 MG/DL (ref 5–24)
PROT/CREAT UR: 0.13 MG/MG CREAT (ref 0.02–0.16)
PROT/CREAT UR: 129 MG/G CREAT (ref 21–161)
PTH-INTACT SERPL-MCNC: 67 PG/ML (ref 14–64)

## 2019-10-21 ENCOUNTER — TELEPHONE (OUTPATIENT)
Dept: NEPHROLOGY | Facility: CLINIC | Age: 74
End: 2019-10-21

## 2019-10-21 NOTE — TELEPHONE ENCOUNTER
----- Message from Alessandra Piña MD sent at 10/20/2019  8:41 PM EDT -----  Hello    Patient normally is followed up by Ms Lena Orellana  Can the patient be notified that the renal function is relatively stable  Phosphorus slightly high but nothing to do for this for now  No changes for now  Will review in detail at the next appointment next month      Thank you    np

## 2019-10-21 NOTE — RESULT ENCOUNTER NOTE
Hello    Patient normally is followed up by Ms Hitesh Daniel  Can the patient be notified that the renal function is relatively stable  Phosphorus slightly high but nothing to do for this for now  No changes for now  Will review in detail at the next appointment next month      Thank you    np

## 2019-11-20 ENCOUNTER — DOCUMENTATION (OUTPATIENT)
Dept: NEPHROLOGY | Facility: CLINIC | Age: 74
End: 2019-11-20

## 2019-11-20 LAB
EXT GLUCOSE BLD: 176
EXTERNAL ALBUMIN: 4.8
EXTERNAL ALK PHOS: 115
EXTERNAL ALT: 16
EXTERNAL AST: 16
EXTERNAL BICARBONATE: 28
EXTERNAL BUN: 27
EXTERNAL CALCIUM: 10.1
EXTERNAL CHLORIDE: 98
EXTERNAL CREATININE: 1.16
EXTERNAL EGFR: 47
EXTERNAL POTASSIUM: 4.4
EXTERNAL SODIUM: 137

## 2019-11-26 ENCOUNTER — OFFICE VISIT (OUTPATIENT)
Dept: NEPHROLOGY | Facility: CLINIC | Age: 74
End: 2019-11-26
Payer: MEDICARE

## 2019-11-26 VITALS
WEIGHT: 115 LBS | BODY MASS INDEX: 20.38 KG/M2 | HEIGHT: 63 IN | SYSTOLIC BLOOD PRESSURE: 122 MMHG | DIASTOLIC BLOOD PRESSURE: 58 MMHG

## 2019-11-26 DIAGNOSIS — N18.2 CKD (CHRONIC KIDNEY DISEASE), STAGE II: Primary | ICD-10-CM

## 2019-11-26 DIAGNOSIS — E83.39 HYPERPHOSPHATEMIA: ICD-10-CM

## 2019-11-26 DIAGNOSIS — D80.1 HYPOGAMMAGLOBULINEMIA (HCC): ICD-10-CM

## 2019-11-26 PROCEDURE — 99214 OFFICE O/P EST MOD 30 MIN: CPT | Performed by: INTERNAL MEDICINE

## 2019-11-26 RX ORDER — CHOLECALCIFEROL (VITAMIN D3) 50 MCG
2000 TABLET ORAL DAILY
COMMUNITY

## 2019-11-26 NOTE — PROGRESS NOTES
NEPHROLOGY OFFICE VISIT   Jeaneth Rodriguez 68 y o  female MRN: [id number]  11/26/2019    Reason for Visit: CKD I-II    ASSESSMENT and PLAN:    I had the pleasure of seeing Ms Kylie Cox today in the renal clinic for the continued management of CKD I-II  Since our last visit, there has been no ER visits or hospitalizations  He currently has no complaints at this time and is feeling well  Patient denies any chest pain, shortness of breath and swelling  The last blood work was done on 10/16, which we have reviewed together  67 yo female with PMHx of DM (diagnosed 32 years ago), no retinopathy, HTN (diagnosed 32 years ago), HPL, CAD status post PCI in April, patient appears to have had 4 cardiac catheterizations in the last 2 or 3 months, ejection fraction improved to 60% in May, vasovagal syncope in May, former smoker quit 46 years ago (smoked for 10 years), who presents for follow up evaluation for proteinuria, and CKD       1) 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 with recent SCOTT     Patient may have had acute kidney injury in the setting of four cardiac catheterizations over the last 3 months prior to last appt in July 2019      -creatinine 0 8-1 mg/dL in April 2019  -creatinine on 5/9/2019 was 1 27 per dL  -repeat creatinine on 05/23 was 1 09 mg/dL  -microalbumin to creatinine ratio 1106 mg/g from 10 mg/g in 2018  - UA with hematuria and pyuria in march 2019 and was treated for UTI at that time (UA bland in 2018)  - A1c 7 2% in march 2019  -urine sediment in the office today with small protein   Otherwise bland   Urine sediment was squamous epithelial cells otherwise no other cells noted    - renal u/s from 6/5/19 - right kidney 9 3 cm, left kidney 9 7 cm, bilateral kidneys are lobulated diffusely, right kidney possible 3 mm calculus, left kidney simple cyst   -repeat renal ultrasound in June 2020  -SPEP-hypogammaglobulinemia  -UPEP-no monoclonal band (but states glomerular pattern, though mainly albumin)  -U PCR 91 milligram/gram on recheck in june  - UA bland  - C3, C4 nl  - kappa/lamda ratio nl  - Cr 0 97 with stable electrolytes in June  - Cr in oct 1 16 with stable electrolytes     - overall, it appears pt renal function has returned to baseline 1 mg/dL; SPEP, UPEP were listed as glomerular pattern, but K/L ratio nl, UPCR improved, and Cr stable  - therefore, will repeat labwork in 4-5 months with k/l and FANNY  - recheck PTH and phos and calcium   - renal u/s in June 2020 to f/u cyst     2) Hyperkalemia - given chronic issue, it is likely due to impaired urinary potassium excretion      -potassium 5 1 on 05/09 but as high as 6 in April  -potassium level has improved starting in May to normal levels and remains nl  - pt appears to have had hyperkalemia since 2015  -concern for RTA  -may consider low-dose thiazide diuretic if needed     3) Anemia-f/u Hb per Primary Team     4) UTI in March     -recently treated for UTI for Citrobacter with macrobid in March     5) HTN - controlled     -monitor with metoprolol for now     6) acid/base     - recheck bicarb stable     7) microalbuminuria     - recheck next visit with UPCR  - A1c 7 8%    8) elevated phos    - recheck PTH, phos next visit     It was a pleasure evaluating your patient in the office today  Thank you for allowing our team to participate in the [facility]  Please do not hesitate to contact our team if further issues/questions shall arise in the interim      No problem-specific Assessment & Plan notes found for this encounter  HPI:    Pt denies complaints  No n/v  No urinary complaints       PATIENT INSTRUCTIONS:    Patient Instructions   1) Avoid NSAIDS - (Example - motrin, advil, ibuprofen, aleve, exederin, etc)  2) Always follow a low salt diet  3) labwork in 4-5 months  4) appt in 5 months        OBJECTIVE:  Current Weight: Weight - Scale: 52 2 kg (115 lb)  Vitals:    11/26/19 1014   BP: 122/58   BP Location: Left arm   Patient Position: Sitting   Cuff Size: Standard   Weight: 52 2 kg (115 lb)   Height: 5' 3" (1 6 m)    Body mass index is 20 37 kg/m²  REVIEW OF SYSTEMS:    Review of Systems   Constitutional: Negative  Negative for fatigue  HENT: Negative  Eyes: Negative  Respiratory: Negative  Negative for shortness of breath  Cardiovascular: Negative  Negative for leg swelling  Gastrointestinal: Negative  Endocrine: Negative  Genitourinary: Negative  Negative for difficulty urinating  Musculoskeletal: Negative  Skin: Negative  Allergic/Immunologic: Negative  Neurological: Negative  Hematological: Negative  Psychiatric/Behavioral: Negative  All other systems reviewed and are negative  PHYSICAL EXAM:      Physical Exam   Constitutional: She is oriented to person, place, and time  She appears well-developed and well-nourished  No distress  HENT:   Head: Normocephalic and atraumatic  Mouth/Throat: No oropharyngeal exudate  Eyes: Conjunctivae are normal  Right eye exhibits no discharge  Left eye exhibits no discharge  No scleral icterus  Neck: Normal range of motion  Neck supple  No JVD present  Cardiovascular: Normal rate and regular rhythm  Exam reveals no gallop and no friction rub  No murmur heard  Pulmonary/Chest: Effort normal and breath sounds normal  No respiratory distress  She has no wheezes  She has no rales  Abdominal: Soft  Bowel sounds are normal  She exhibits no distension  There is no tenderness  There is no rebound  Musculoskeletal: Normal range of motion  She exhibits no edema, tenderness or deformity  Neurological: She is alert and oriented to person, place, and time  Coordination normal    Skin: Skin is warm and dry  No rash noted  She is not diaphoretic  No erythema  No pallor  Psychiatric: She has a normal mood and affect  Her behavior is normal  Judgment and thought content normal    Nursing note and vitals reviewed        Medications:    Current Outpatient Medications:     aspirin 81 mg chewable tablet, Chew, Disp: , Rfl:     atorvastatin (LIPITOR) 40 mg tablet, Take 40 mg by mouth daily, Disp: , Rfl:     bimatoprost (LUMIGAN) 0 01 % ophthalmic drops, Apply to eye, Disp: , Rfl:     Calcium Carbonate-Vit D-Min (CALCIUM 1200 PO), Take 1,200 mg by mouth, Disp: , Rfl:     Cholecalciferol (VITAMIN D) 50 MCG (2000 UT) tablet, Take 2,000 Units by mouth daily, Disp: , Rfl:     clopidogrel (PLAVIX) 75 mg tablet, Take 75 mg by mouth daily, Disp: , Rfl:     Empagliflozin (JARDIANCE) 10 MG TABS, Take 10 mg by mouth every morning, Disp: , Rfl:     Linagliptin (TRADJENTA) 5 MG TABS, Take by mouth, Disp: , Rfl:     metoprolol tartrate (LOPRESSOR) 25 mg tablet, Take 25 mg by mouth daily, Disp: , Rfl:     nitroglycerin (NITRODUR) 0 1 mg/hr, Place 1 patch on the skin daily, Disp: , Rfl:     pantoprazole (PROTONIX) 40 mg tablet, Take 40 mg by mouth daily, Disp: , Rfl:     ranolazine (RANEXA) 500 mg 12 hr tablet, Take 500 mg by mouth 2 (two) times a day, Disp: , Rfl:     glimepiride (AMARYL) 4 mg tablet, Take 4 mg by mouth every morning before breakfast, Disp: , Rfl:     repaglinide (PRANDIN) 1 mg tablet, Take 1 mg by mouth 3 (three) times a day before meals, Disp: , Rfl:     Laboratory Results:        Invalid input(s): ALBUMIN    Results for orders placed or performed in visit on 11/20/19   Comprehensive metabolic panel   Result Value Ref Range    SODIUM 137     POTASSIUM 4 4     CHLORIDE 98     BICARB 28     BUN 27     CREATININE 1 16     Glucose 176     EXTERNAL CALCIUM 10 1     ALBUMIN 4 8     AST 16     ALT 16     ALK PHOS 115     EXTERNAL EGFR 47

## 2019-11-26 NOTE — LETTER
November 26, 2019     Eliz Salinas, [address]  [facility]    Patient: Divya Schmitz   YOB: 1945   Date of Visit: 11/26/2019       Dear Dr Sima Werner: Thank you for referring Chapin Woodard to me for evaluation  Below are my notes for this consultation  If you have questions, please do not hesitate to call me  I look forward to following your patient along with you  Sincerely,        Shalini Sousa MD        CC: No Recipients  Shalini Sousa MD  11/26/2019 11:02 AM  Sign at close encounter  [address] 68 y o  female MRN: [id number]  11/26/2019    Reason for Visit: CKD I-II    ASSESSMENT and PLAN:    I had the pleasure of seeing Ms Keren Amor today in the renal clinic for the continued management of CKD I-II  Since our last visit, there has been no ER visits or hospitalizations  He currently has no complaints at this time and is feeling well  Patient denies any chest pain, shortness of breath and swelling  The last blood work was done on 10/16, which we have reviewed together      67 yo female with PMHx of DM (diagnosed 32 years ago), no retinopathy, HTN (diagnosed 32 years ago), HPL, CAD status post PCI in April, patient appears to have had 4 cardiac catheterizations in the last 2 or 3 months, ejection fraction improved to 60% in May, vasovagal syncope in May, former smoker quit 46 years ago (smoked for 10 years), who presents for follow up evaluation for proteinuria, and CKD       1) 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 with recent SCOTT     Patient may have had acute kidney injury in the setting of four cardiac catheterizations over the last 3 months prior to last appt in July 2019      -creatinine 0 8-1 mg/dL in April 2019  -creatinine on 5/9/2019 was 1 27 per dL  -repeat creatinine on 05/23 was 1 09 mg/dL  -microalbumin to creatinine ratio 1106 mg/g from 10 mg/g in 2018  - UA with hematuria and pyuria in march 2019 and was treated for UTI at that time (UA bland in 2018)  - A1c 7 2% in march 2019  -urine sediment in the office today with small protein   Otherwise bland   Urine sediment was squamous epithelial cells otherwise no other cells noted  - renal u/s from 6/5/19 - right kidney 9 3 cm, left kidney 9 7 cm, bilateral kidneys are lobulated diffusely, right kidney possible 3 mm calculus, left kidney simple cyst   -repeat renal ultrasound in June 2020  -SPEP-hypogammaglobulinemia  -UPEP-no monoclonal band (but states glomerular pattern, though mainly albumin)  -U PCR 91 milligram/gram on recheck in june  - UA bland  - C3, C4 nl  - kappa/lamda ratio nl  - Cr 0 97 with stable electrolytes in June  - Cr in oct 1 16 with stable electrolytes     - overall, it appears pt renal function has returned to baseline 1 mg/dL; SPEP, UPEP were listed as glomerular pattern, but K/L ratio nl, UPCR improved, and Cr stable  - therefore, will repeat labwork in 4-5 months with k/l and FANNY  - recheck PTH and phos and calcium   - renal u/s in June 2020 to f/u cyst     2) Hyperkalemia - given chronic issue, it is likely due to impaired urinary potassium excretion      -potassium 5 1 on 05/09 but as high as 6 in April  -potassium level has improved starting in May to normal levels and remains nl  - pt appears to have had hyperkalemia since 2015  -concern for RTA  -may consider low-dose thiazide diuretic if needed     3) Anemia-f/u Hb per Primary Team     4) UTI in March     -recently treated for UTI for Citrobacter with macrobid in March     5) HTN - controlled     -monitor with metoprolol for now     6) acid/base     - recheck bicarb stable     7) microalbuminuria     - recheck next visit with UPCR  - A1c 7 8%    8) elevated phos    - recheck PTH, phos next visit     It was a pleasure evaluating your patient in the office today  Thank you for allowing our team to participate in the care Mary [facility]   Please do not hesitate to contact our team if further issues/questions shall arise in the interim      No problem-specific Assessment & Plan notes found for this encounter  HPI:    Pt denies complaints  No n/v  No urinary complaints  PATIENT INSTRUCTIONS:    Patient Instructions   1) Avoid NSAIDS - (Example - motrin, advil, ibuprofen, aleve, exederin, etc)  2) Always follow a low salt diet  3) labwork in 4-5 months  4) appt in 5 months        OBJECTIVE:  Current Weight: Weight - Scale: 52 2 kg (115 lb)  Vitals:    11/26/19 1014   BP: 122/58   BP Location: Left arm   Patient Position: Sitting   Cuff Size: Standard   Weight: 52 2 kg (115 lb)   Height: 5' 3" (1 6 m)    Body mass index is 20 37 kg/m²  REVIEW OF SYSTEMS:    Review of Systems   Constitutional: Negative  Negative for fatigue  HENT: Negative  Eyes: Negative  Respiratory: Negative  Negative for shortness of breath  Cardiovascular: Negative  Negative for leg swelling  Gastrointestinal: Negative  Endocrine: Negative  Genitourinary: Negative  Negative for difficulty urinating  Musculoskeletal: Negative  Skin: Negative  Allergic/Immunologic: Negative  Neurological: Negative  Hematological: Negative  Psychiatric/Behavioral: Negative  All other systems reviewed and are negative  PHYSICAL EXAM:      Physical Exam   Constitutional: She is oriented to person, place, and time  She appears well-developed and well-nourished  No distress  HENT:   Head: Normocephalic and atraumatic  Mouth/Throat: No oropharyngeal exudate  Eyes: Conjunctivae are normal  Right eye exhibits no discharge  Left eye exhibits no discharge  No scleral icterus  Neck: Normal range of motion  Neck supple  No JVD present  Cardiovascular: Normal rate and regular rhythm  Exam reveals no gallop and no friction rub  No murmur heard  Pulmonary/Chest: Effort normal and breath sounds normal  No respiratory distress  She has no wheezes  She has no rales     Abdominal: Soft  Bowel sounds are normal  She exhibits no distension  There is no tenderness  There is no rebound  Musculoskeletal: Normal range of motion  She exhibits no edema, tenderness or deformity  Neurological: She is alert and oriented to person, place, and time  Coordination normal    Skin: Skin is warm and dry  No rash noted  She is not diaphoretic  No erythema  No pallor  Psychiatric: She has a normal mood and affect  Her behavior is normal  Judgment and thought content normal    Nursing note and vitals reviewed        Medications:    Current Outpatient Medications:     aspirin 81 mg chewable tablet, Chew, Disp: , Rfl:     atorvastatin (LIPITOR) 40 mg tablet, Take 40 mg by mouth daily, Disp: , Rfl:     bimatoprost (LUMIGAN) 0 01 % ophthalmic drops, Apply to eye, Disp: , Rfl:     Calcium Carbonate-Vit D-Min (CALCIUM 1200 PO), Take 1,200 mg by mouth, Disp: , Rfl:     Cholecalciferol (VITAMIN D) 50 MCG (2000 UT) tablet, Take 2,000 Units by mouth daily, Disp: , Rfl:     clopidogrel (PLAVIX) 75 mg tablet, Take 75 mg by mouth daily, Disp: , Rfl:     Empagliflozin (JARDIANCE) 10 MG TABS, Take 10 mg by mouth every morning, Disp: , Rfl:     Linagliptin (TRADJENTA) 5 MG TABS, Take by mouth, Disp: , Rfl:     metoprolol tartrate (LOPRESSOR) 25 mg tablet, Take 25 mg by mouth daily, Disp: , Rfl:     nitroglycerin (NITRODUR) 0 1 mg/hr, Place 1 patch on the skin daily, Disp: , Rfl:     pantoprazole (PROTONIX) 40 mg tablet, Take 40 mg by mouth daily, Disp: , Rfl:     ranolazine (RANEXA) 500 mg 12 hr tablet, Take 500 mg by mouth 2 (two) times a day, Disp: , Rfl:     glimepiride (AMARYL) 4 mg tablet, Take 4 mg by mouth every morning before breakfast, Disp: , Rfl:     repaglinide (PRANDIN) 1 mg tablet, Take 1 mg by mouth 3 (three) times a day before meals, Disp: , Rfl:     Laboratory Results:        Invalid input(s): ALBUMIN    Results for orders placed or performed in visit on 11/20/19   Comprehensive metabolic panel   Result Value Ref Range    SODIUM 137     POTASSIUM 4 4     CHLORIDE 98     BICARB 28     BUN 27     CREATININE 1 16     Glucose 176     EXTERNAL CALCIUM 10 1     ALBUMIN 4 8     AST 16     ALT 16     ALK PHOS 115     EXTERNAL EGFR 47

## 2019-11-26 NOTE — PATIENT INSTRUCTIONS
1) Avoid NSAIDS - (Example - motrin, advil, ibuprofen, aleve, exederin, etc)  2) Always follow a low salt diet  3) labwork in 4-5 months  4) appt in 5 months

## 2020-06-22 LAB
CREAT ?TM UR-SCNC: 73 UMOL/L
CREAT ?TM UR-SCNC: 73 UMOL/L
EXT MICROALBUMIN URINE RANDOM: 0.5
EXT PROTEIN URINE: 10
HBA1C MFR BLD HPLC: 6.5 %
MICROALBUMIN/CREAT UR: 6 MG/G{CREAT}
PROT/CREAT UR: 137 MG/G{CREAT}

## 2020-09-30 ENCOUNTER — TELEPHONE (OUTPATIENT)
Dept: NEPHROLOGY | Facility: CLINIC | Age: 75
End: 2020-09-30

## 2020-09-30 DIAGNOSIS — R80.9 PROTEINURIA, UNSPECIFIED TYPE: ICD-10-CM

## 2020-09-30 DIAGNOSIS — N18.2 CKD (CHRONIC KIDNEY DISEASE), STAGE II: Primary | ICD-10-CM

## 2020-10-23 LAB
APPEARANCE UR: ABNORMAL
BACTERIA UR QL AUTO: ABNORMAL /HPF
BILIRUB UR QL STRIP: NEGATIVE
CAOX CRY #/AREA URNS HPF: ABNORMAL /HPF
COLOR UR: ABNORMAL
CREAT UR-MCNC: 98 MG/DL (ref 20–275)
GLUCOSE UR QL STRIP: ABNORMAL
HGB UR QL STRIP: NEGATIVE
HYALINE CASTS #/AREA URNS LPF: ABNORMAL /LPF
KETONES UR QL STRIP: NEGATIVE
LEUKOCYTE ESTERASE UR QL STRIP: NEGATIVE
NITRITE UR QL STRIP: NEGATIVE
PH UR STRIP: 6.5 [PH] (ref 5–8)
PROT UR QL STRIP: NEGATIVE
PROT UR-MCNC: 11 MG/DL (ref 5–24)
PROT/CREAT UR: 0.11 MG/MG CREAT (ref 0.02–0.16)
PROT/CREAT UR: 112 MG/G CREAT (ref 21–161)
RBC #/AREA URNS HPF: ABNORMAL /HPF
SP GR UR STRIP: 1.03 (ref 1–1.03)
SQUAMOUS #/AREA URNS HPF: ABNORMAL /HPF
WBC #/AREA URNS HPF: ABNORMAL /HPF

## 2020-10-24 LAB
ALBUMIN SERPL-MCNC: 4.2 G/DL (ref 3.6–5.1)
ALBUMIN/GLOB SERPL: 1.9 (CALC) (ref 1–2.5)
ALP SERPL-CCNC: 73 U/L (ref 37–153)
ALT SERPL-CCNC: 13 U/L (ref 6–29)
AST SERPL-CCNC: 19 U/L (ref 10–35)
BILIRUB SERPL-MCNC: 0.5 MG/DL (ref 0.2–1.2)
BUN SERPL-MCNC: 17 MG/DL (ref 7–25)
BUN/CREAT SERPL: 15 (CALC) (ref 6–22)
CALCIUM SERPL-MCNC: 9.7 MG/DL (ref 8.6–10.4)
CHLORIDE SERPL-SCNC: 100 MMOL/L (ref 98–110)
CO2 SERPL-SCNC: 32 MMOL/L (ref 20–32)
CREAT SERPL-MCNC: 1.12 MG/DL (ref 0.6–0.93)
GLOBULIN SER CALC-MCNC: 2.2 G/DL (CALC) (ref 1.9–3.7)
GLUCOSE SERPL-MCNC: 119 MG/DL (ref 65–99)
KAPPA LC FREE SER-MCNC: 19.8 MG/L (ref 3.3–19.4)
KAPPA LC FREE/LAMBDA FREE SER: 1.8 {RATIO} (ref 0.26–1.65)
LAMBDA LC FREE SERPL-MCNC: 11 MG/L (ref 5.7–26.3)
POTASSIUM SERPL-SCNC: 5 MMOL/L (ref 3.5–5.3)
PROT SERPL-MCNC: 6.4 G/DL (ref 6.1–8.1)
SL AMB EGFR AFRICAN AMERICAN: 56 ML/MIN/1.73M2
SL AMB EGFR NON AFRICAN AMERICAN: 48 ML/MIN/1.73M2
SODIUM SERPL-SCNC: 138 MMOL/L (ref 135–146)

## 2020-11-02 ENCOUNTER — TELEPHONE (OUTPATIENT)
Dept: OTHER | Facility: HOSPITAL | Age: 75
End: 2020-11-02

## 2020-11-03 ENCOUNTER — TELEPHONE (OUTPATIENT)
Dept: NEPHROLOGY | Facility: CLINIC | Age: 75
End: 2020-11-03

## 2020-11-03 DIAGNOSIS — N18.2 CKD (CHRONIC KIDNEY DISEASE), STAGE II: Primary | ICD-10-CM

## 2020-11-03 DIAGNOSIS — D80.1 HYPOGAMMAGLOBULINEMIA (HCC): ICD-10-CM

## 2020-11-03 DIAGNOSIS — R80.9 PROTEINURIA, UNSPECIFIED TYPE: ICD-10-CM

## 2020-12-10 ENCOUNTER — HOSPITAL ENCOUNTER (OUTPATIENT)
Dept: GASTROENTEROLOGY | Facility: AMBULARY SURGERY CENTER | Age: 75
Setting detail: OUTPATIENT SURGERY
Discharge: HOME | End: 2020-12-10
Attending: INTERNAL MEDICINE

## 2020-12-10 DIAGNOSIS — Z20.822 COVID-19 RULED OUT BY LABORATORY TESTING: Primary | ICD-10-CM

## 2021-01-05 ENCOUNTER — TELEPHONE (OUTPATIENT)
Dept: NEPHROLOGY | Facility: CLINIC | Age: 76
End: 2021-01-05

## 2021-01-05 NOTE — RESULT ENCOUNTER NOTE
Hello    Patient normally is followed up by Ms Ramirez Harris  Please let the patient know that the potassium is borderline elevated  Please ask the patient to follow low-potassium diet  Will review in more detail at the appointment next week  Renal function is stable      Thank you    np

## 2021-01-05 NOTE — TELEPHONE ENCOUNTER
----- Message from Nani Bradshaw MD sent at 1/5/2021  1:19 PM EST -----  Hello    Patient normally is followed up by Ms Lloyd Ozuna  Please let the patient know that the potassium is borderline elevated  Please ask the patient to follow low-potassium diet  Will review in more detail at the appointment next week  Renal function is stable      Thank you    np

## 2021-01-06 LAB
ALBUMIN SERPL ELPH-MCNC: 4.3 G/DL (ref 3.8–4.8)
ALBUMIN SERPL-MCNC: 4.5 G/DL (ref 3.6–5.1)
ALBUMIN/GLOB SERPL: 1.9 (CALC) (ref 1–2.5)
ALP SERPL-CCNC: 86 U/L (ref 37–153)
ALPHA1 GLOB SERPL ELPH-MCNC: 0.3 G/DL (ref 0.2–0.3)
ALPHA2 GLOB SERPL ELPH-MCNC: 1 G/DL (ref 0.5–0.9)
ALT SERPL-CCNC: 11 U/L (ref 6–29)
AST SERPL-CCNC: 16 U/L (ref 10–35)
BETA1 GLOB SERPL ELPH-MCNC: 0.5 G/DL (ref 0.4–0.6)
BETA2 GLOB SERPL ELPH-MCNC: 0.3 G/DL (ref 0.2–0.5)
BILIRUB SERPL-MCNC: 0.5 MG/DL (ref 0.2–1.2)
BUN SERPL-MCNC: 19 MG/DL (ref 7–25)
BUN/CREAT SERPL: 18 (CALC) (ref 6–22)
CALCIUM SERPL-MCNC: 10 MG/DL (ref 8.6–10.4)
CHLORIDE SERPL-SCNC: 100 MMOL/L (ref 98–110)
CO2 SERPL-SCNC: 32 MMOL/L (ref 20–32)
CREAT SERPL-MCNC: 1.08 MG/DL (ref 0.6–0.93)
GAMMA GLOB SERPL ELPH-MCNC: 0.6 G/DL (ref 0.8–1.7)
GLOBULIN SER CALC-MCNC: 2.4 G/DL (CALC) (ref 1.9–3.7)
GLUCOSE SERPL-MCNC: 111 MG/DL (ref 65–99)
KAPPA LC FREE SER-MCNC: 19.1 MG/L (ref 3.3–19.4)
KAPPA LC FREE/LAMBDA FREE SER: 2.33 {RATIO} (ref 0.26–1.65)
LAMBDA LC FREE SERPL-MCNC: 8.2 MG/L (ref 5.7–26.3)
POTASSIUM SERPL-SCNC: 5.1 MMOL/L (ref 3.5–5.3)
PROT PATTERN SERPL ELPH-IMP: ABNORMAL
PROT SERPL-MCNC: 6.9 G/DL (ref 6.1–8.1)
PROT SERPL-MCNC: 6.9 G/DL (ref 6.1–8.1)
SL AMB EGFR AFRICAN AMERICAN: 58 ML/MIN/1.73M2
SL AMB EGFR NON AFRICAN AMERICAN: 50 ML/MIN/1.73M2
SODIUM SERPL-SCNC: 139 MMOL/L (ref 135–146)

## 2021-01-07 NOTE — RESULT ENCOUNTER NOTE
Elevated kappa/lambda ratio  Absolute values not significantly elevated  Will review with the patient at the appointment  May consider hematology referral depending on patient's view

## 2021-01-11 ENCOUNTER — OFFICE VISIT (OUTPATIENT)
Dept: NEPHROLOGY | Facility: CLINIC | Age: 76
End: 2021-01-11
Payer: MEDICARE

## 2021-01-11 VITALS
SYSTOLIC BLOOD PRESSURE: 118 MMHG | HEIGHT: 65 IN | DIASTOLIC BLOOD PRESSURE: 76 MMHG | BODY MASS INDEX: 20.99 KG/M2 | WEIGHT: 126 LBS

## 2021-01-11 DIAGNOSIS — N28.1 RENAL CYST: ICD-10-CM

## 2021-01-11 DIAGNOSIS — R80.9 PROTEINURIA, UNSPECIFIED TYPE: ICD-10-CM

## 2021-01-11 DIAGNOSIS — D89.2 PARAPROTEINEMIA: ICD-10-CM

## 2021-01-11 DIAGNOSIS — N18.2 CKD (CHRONIC KIDNEY DISEASE), STAGE II: Primary | ICD-10-CM

## 2021-01-11 DIAGNOSIS — E87.5 HYPERKALEMIA: ICD-10-CM

## 2021-01-11 PROCEDURE — 99214 OFFICE O/P EST MOD 30 MIN: CPT | Performed by: INTERNAL MEDICINE

## 2021-01-11 NOTE — LETTER
January 11, 2021     Analilia Wright, [address]  [address] [facility]    Patient: Raghavendra Pond   YOB: 1945   Date of Visit: 1/11/2021       Dear Dr Dinorah Dodson: Thank you for referring Theresa Duron to me for evaluation  Below are my notes for this consultation  If you have questions, please do not hesitate to call me  I look forward to following your patient along with you  Sincerely,        Gilda Christina MD        CC: MD Gilda Palmer MD  1/11/2021  2:01 PM  Sign when Signing Visit  [address] [facility] 76 y o  female MRN: [id number]  1/11/2021    Reason for Visit: CKD II    ASSESSMENT and PLAN:    I had the pleasure of seeing Ms Delma Coe today in the renal clinic for the continued management of CKD II       patient was last seen in November 2019      77 yo female with PMHx of DM (diagnosed 32 years ago), no retinopathy, HTN (diagnosed 32 years ago), HPL, CAD status post PCI in April 2019, patient appears to have had 4 cardiac catheterizations in 2019, ejection fraction improved to 60% in May 2019, vasovagal syncope in May, former smoker quit 46 years ago (smoked for 10 years), who presents for follow up evaluation for proteinuria, and CKD       1) 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 with recent SCOTT     Patient may have had acute kidney injury in the setting of four cardiac catheterizations over the 3 months prior to last appt in July 2019      -creatinine 0 8-1 mg/dL in April 2019  -creatinine on 5/9/2019 was 1 27 per dL  -repeat creatinine on 05/23 was 1 09 mg/dL  - creatinine January 2021 was stable at 1 08 mg/dL    -  Most recent U PCR is 0 112 milligram/milligram in October of 2020  - urinalysis in October 2020 -6-10 epithelial cells, many calcium oxalate crystals, 3+ glucose  - A1c 7 2% in march 2019  - renal u/s from 6/5/19 - right kidney 9 3 cm, left kidney 9 7 cm, bilateral kidneys are lobulated diffusely, right kidney possible 3 mm calculus, left kidney simple cyst   -repeat renal ultrasound in June 2020  -SPEP-hypogammaglobulinemia  -UPEP-no monoclonal band (but states glomerular pattern, though mainly albumin)  - C3, C4 nl  - kappa/lamda ratio  Slightly rising  1 8 in October 2020  Repeat is slightly higher at 2 3   -  SPEP repeated in January 2021 with selective protein loss pattern suggestive of nephrotic syndrome     overall, patient does not have nephrotic syndrome, has normal albumin, has nonnephrotic range proteinuria  Has slightly rising kappa/lambda ratio  At this juncture given multiple values that are slightly abnormal, will refer to the hematologist for evaluation     -  Refer to hematology for evaluation of abnormal kappa / lambda ratio   -  Recheck renal ultrasound to follow up renal cysts   -Lab work in 3-4 months   - recheck BMP in one month  - low K diet  - ultrasound before next appt  - recheck k/l ratio  Reviewed with pt we will check one more ratio and refer to Hematology next visit      2) Hyperkalemia - given chronic issue, it is likely due to impaired urinary potassium excretion      -potassium 5 1 on 05/09 but as high as 6 in April  -potassium level has improved but was borderline elevated 1/2021  - recheck in one month  - pt appears to have had hyperkalemia since 2015  -concern for RTA  -may consider low-dose thiazide diuretic if needed  - for now, was eating more spinach with last labs  So recheck now that has removed spinach from diet     3) Anemia-f/u Hb per Primary Team     4) HTN - controlled     -monitor with metoprolol for now     5) acid/base     - recheck bicarb stable     6) microalbuminuria     - recheck next visit with UPCR  - A1c 7 8%     7) elevated phos     - recheck PTH, phos next visit    8) rash b/l dorsum of foot started one week ago    - rash does not appear pustular  - pruritic   Painful  - may be becoming scaly  - asked pt to talk to PCP     It was a pleasure evaluating your patient in the office today  Thank you for allowing our team to participate in the care Erika Ruelas  Please do not hesitate to contact our team if further issues/questions shall arise in the interim         No problem-specific Assessment & Plan notes found for this encounter  HPI:    With rash b/l dorsum foot  No recent illness  No n/v/SOB    PATIENT INSTRUCTIONS:    There are no Patient Instructions on file for this visit  OBJECTIVE:  Current Weight:    There were no vitals filed for this visit  There is no height or weight on file to calculate BMI  REVIEW OF SYSTEMS:    Review of Systems   Constitutional: Negative  Negative for fatigue  HENT: Negative  Eyes: Negative  Respiratory: Negative  Negative for shortness of breath  Cardiovascular: Negative  Negative for leg swelling  Gastrointestinal: Negative  Endocrine: Negative  Genitourinary: Negative  Negative for difficulty urinating  Musculoskeletal: Negative  Skin: Positive for rash  Dorsum of foot   Allergic/Immunologic: Negative  Neurological: Negative  Hematological: Negative  Psychiatric/Behavioral: Negative  All other systems reviewed and are negative  PHYSICAL EXAM:      Physical Exam  Vitals signs and nursing note reviewed  Constitutional:       General: She is not in acute distress  Appearance: She is well-developed  She is not diaphoretic  HENT:      Head: Normocephalic and atraumatic  Mouth/Throat:      Pharynx: No oropharyngeal exudate  Eyes:      General: No scleral icterus  Right eye: No discharge  Left eye: No discharge  Conjunctiva/sclera: Conjunctivae normal    Neck:      Musculoskeletal: Normal range of motion and neck supple  Vascular: No JVD  Cardiovascular:      Rate and Rhythm: Normal rate and regular rhythm  Heart sounds: No murmur  No friction rub  No gallop      Pulmonary:      Effort: Pulmonary effort is normal  No respiratory distress  Breath sounds: Normal breath sounds  No wheezing or rales  Abdominal:      General: Bowel sounds are normal  There is no distension  Palpations: Abdomen is soft  Tenderness: There is no abdominal tenderness  There is no rebound  Musculoskeletal: Normal range of motion  General: No tenderness or deformity  Skin:     General: Skin is warm and dry  Coloration: Skin is not pale  Findings: Rash present  No erythema  Comments: Erythema dorusm of feet b/l distal     Neurological:      Mental Status: She is alert and oriented to person, place, and time  Coordination: Coordination normal    Psychiatric:         Behavior: Behavior normal          Thought Content:  Thought content normal          Judgment: Judgment normal          Medications:    Current Outpatient Medications:     aspirin 81 mg chewable tablet, Chew, Disp: , Rfl:     atorvastatin (LIPITOR) 40 mg tablet, Take 40 mg by mouth daily, Disp: , Rfl:     bimatoprost (LUMIGAN) 0 01 % ophthalmic drops, Apply to eye, Disp: , Rfl:     Calcium Carbonate-Vit D-Min (CALCIUM 1200 PO), Take 1,200 mg by mouth, Disp: , Rfl:     Cholecalciferol (VITAMIN D) 50 MCG (2000 UT) tablet, Take 2,000 Units by mouth daily, Disp: , Rfl:     clopidogrel (PLAVIX) 75 mg tablet, Take 75 mg by mouth daily, Disp: , Rfl:     Empagliflozin (JARDIANCE) 10 MG TABS, Take 10 mg by mouth every morning, Disp: , Rfl:     glimepiride (AMARYL) 4 mg tablet, Take 4 mg by mouth every morning before breakfast, Disp: , Rfl:     Linagliptin (TRADJENTA) 5 MG TABS, Take by mouth, Disp: , Rfl:     metoprolol tartrate (LOPRESSOR) 25 mg tablet, Take 25 mg by mouth daily, Disp: , Rfl:     nitroglycerin (NITRODUR) 0 1 mg/hr, Place 1 patch on the skin daily, Disp: , Rfl:     pantoprazole (PROTONIX) 40 mg tablet, Take 40 mg by mouth daily, Disp: , Rfl:     ranolazine (RANEXA) 500 mg 12 hr tablet, Take 500 mg by mouth 2 (two) times a day, Disp: , Rfl:     repaglinide (PRANDIN) 1 mg tablet, Take 1 mg by mouth 3 (three) times a day before meals, Disp: , Rfl:     Laboratory Results:  Results from last 7 days   Lab Units 01/04/21  1233   POTASSIUM mmol/L 5 1   CHLORIDE mmol/L 100   CO2 mmol/L 32   BUN mg/dL 19   CREATININE mg/dL 1 08*   CALCIUM mg/dL 10 0       Results for orders placed or performed in visit on 01/04/21   Protein electrophoresis, serum   Result Value Ref Range    Protein, Total 6 9 6 1 - 8 1 g/dL    Albumin, Electrophoresis 4 3 3 8 - 4 8 g/dL    Alpha-1 Globulin 0 3 0 2 - 0 3 g/dL    Alpha-2 Globulin 1 0 (H) 0 5 - 0 9 g/dL    Beta 1 Globulin 0 5 0 4 - 0 6 g/dL    Beta 2 Globulin 0 3 0 2 - 0 5 g/dL    Gamma Globulin 0 6 (L) 0 8 - 1 7 g/dL    Interpretation       Selective protein loss pattern, suggestive of nephrotic syndrome   Comprehensive metabolic panel   Result Value Ref Range    Glucose, Random 111 (H) 65 - 99 mg/dL    BUN 19 7 - 25 mg/dL    Creatinine 1 08 (H) 0 60 - 0 93 mg/dL    eGFR Non  50 (L) > OR = 60 mL/min/1 73m2    eGFR  58 (L) > OR = 60 mL/min/1 73m2    SL AMB BUN/CREATININE RATIO 18 6 - 22 (calc)    Sodium 139 135 - 146 mmol/L    Potassium 5 1 3 5 - 5 3 mmol/L    Chloride 100 98 - 110 mmol/L    CO2 32 20 - 32 mmol/L    Calcium 10 0 8 6 - 10 4 mg/dL    Protein, Total 6 9 6 1 - 8 1 g/dL    Albumin 4 5 3 6 - 5 1 g/dL    Globulin 2 4 1 9 - 3 7 g/dL (calc)    Albumin/Globulin Ratio 1 9 1 0 - 2 5 (calc)    TOTAL BILIRUBIN 0 5 0 2 - 1 2 mg/dL    Alkaline Phosphatase 86 37 - 153 U/L    AST 16 10 - 35 U/L    ALT 11 6 - 29 U/L   [place]/Lambda Light Chains Free With Ratio, Serum   Result Value Ref Range    Ig Kappa Free Light Chain 19 1 3 3 - 19 4 mg/L    Ig Lambda Free Light Chain 8 2 5 7 - 26 3 mg/L    Kappa/Lambda FluidC Ratio 2 33 (H) 0 26 - 1 65

## 2021-01-11 NOTE — PATIENT INSTRUCTIONS
1) Avoid NSAIDS - (Example - motrin, advil, ibuprofen, aleve, exederin, etc)  2) Always follow a low salt diet  3) Basic metabolic panel - lab in 4 weeks  4) full labs in 4 months  5) renal ultrasound before appointment  6) appointment in 4-5 months  7) no changes to your medications today  8) please talk to PCP about rash on feet

## 2021-01-11 NOTE — PROGRESS NOTES
NEPHROLOGY OFFICE VISIT   Marianna Lozoya 76 y o  female MRN: [id number]  1/11/2021    Reason for Visit: CKD II    ASSESSMENT and PLAN:    I had the pleasure of seeing Ms Vidal Davies today in the renal clinic for the continued management of CKD II       patient was last seen in November 2019      75 yo female with PMHx of DM (diagnosed 32 years ago), no retinopathy, HTN (diagnosed 32 years ago), HPL, CAD status post PCI in April 2019, patient appears to have had 4 cardiac catheterizations in 2019, ejection fraction improved to 60% in May 2019, vasovagal syncope in May, former smoker quit 46 years ago (smoked for 10 years), who presents for follow up evaluation for proteinuria, and CKD       1) 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 with recent SCOTT     Patient may have had acute kidney injury in the setting of four cardiac catheterizations over the 3 months prior to last appt in July 2019      -creatinine 0 8-1 mg/dL in April 2019  -creatinine on 5/9/2019 was 1 27 per dL  -repeat creatinine on 05/23 was 1 09 mg/dL  - creatinine January 2021 was stable at 1 08 mg/dL  -  Most recent U PCR is 0 112 milligram/milligram in October of 2020  - urinalysis in October 2020 -6-10 epithelial cells, many calcium oxalate crystals, 3+ glucose  - A1c 7 2% in march 2019  - renal u/s from 6/5/19 - right kidney 9 3 cm, left kidney 9 7 cm, bilateral kidneys are lobulated diffusely, right kidney possible 3 mm calculus, left kidney simple cyst   -repeat renal ultrasound in June 2020  -SPEP-hypogammaglobulinemia  -UPEP-no monoclonal band (but states glomerular pattern, though mainly albumin)  - C3, C4 nl  - kappa/lamda ratio  Slightly rising  1 8 in October 2020  Repeat is slightly higher at 2 3   -  SPEP repeated in January 2021 with selective protein loss pattern suggestive of nephrotic syndrome     overall, patient does not have nephrotic syndrome, has normal albumin, has nonnephrotic range proteinuria    Has slightly rising kappa/lambda ratio  At this juncture given multiple values that are slightly abnormal, will refer to the hematologist for evaluation     -  Refer to hematology for evaluation of abnormal kappa / lambda ratio   -  Recheck renal ultrasound to follow up renal cysts   -Lab work in 3-4 months   - recheck BMP in one month  - low K diet  - ultrasound before next appt  - recheck k/l ratio  Reviewed with pt we will check one more ratio and refer to Hematology next visit      2) Hyperkalemia - given chronic issue, it is likely due to impaired urinary potassium excretion      -potassium 5 1 on 05/09 but as high as 6 in April  -potassium level has improved but was borderline elevated 1/2021  - recheck in one month  - pt appears to have had hyperkalemia since 2015  -concern for RTA  -may consider low-dose thiazide diuretic if needed  - for now, was eating more spinach with last labs  So recheck now that has removed spinach from diet     3) Anemia-f/u Hb per Primary Team     4) HTN - controlled     -monitor with metoprolol for now     5) acid/base     - recheck bicarb stable     6) microalbuminuria     - recheck next visit with UPCR  - A1c 7 8%     7) elevated phos     - recheck PTH, phos next visit    8) rash b/l dorsum of foot started one week ago    - rash does not appear pustular  - pruritic  Painful  - may be becoming scaly  - asked pt to talk to PCP     It was a pleasure evaluating your patient in the office today  Thank you for allowing our team to participate in the care Lucian Mai  Please do not hesitate to contact our team if further issues/questions shall arise in the interim         No problem-specific Assessment & Plan notes found for this encounter  HPI:    With rash b/l dorsum foot  No recent illness  No n/v/SOB    PATIENT INSTRUCTIONS:    There are no Patient Instructions on file for this visit  OBJECTIVE:  Current Weight:    There were no vitals filed for this visit   There is no height or weight on file to calculate BMI  REVIEW OF SYSTEMS:    Review of Systems   Constitutional: Negative  Negative for fatigue  HENT: Negative  Eyes: Negative  Respiratory: Negative  Negative for shortness of breath  Cardiovascular: Negative  Negative for leg swelling  Gastrointestinal: Negative  Endocrine: Negative  Genitourinary: Negative  Negative for difficulty urinating  Musculoskeletal: Negative  Skin: Positive for rash  Dorsum of foot   Allergic/Immunologic: Negative  Neurological: Negative  Hematological: Negative  Psychiatric/Behavioral: Negative  All other systems reviewed and are negative  PHYSICAL EXAM:      Physical Exam  Vitals signs and nursing note reviewed  Constitutional:       General: She is not in acute distress  Appearance: She is well-developed  She is not diaphoretic  HENT:      Head: Normocephalic and atraumatic  Mouth/Throat:      Pharynx: No oropharyngeal exudate  Eyes:      General: No scleral icterus  Right eye: No discharge  Left eye: No discharge  Conjunctiva/sclera: Conjunctivae normal    Neck:      Musculoskeletal: Normal range of motion and neck supple  Vascular: No JVD  Cardiovascular:      Rate and Rhythm: Normal rate and regular rhythm  Heart sounds: No murmur  No friction rub  No gallop  Pulmonary:      Effort: Pulmonary effort is normal  No respiratory distress  Breath sounds: Normal breath sounds  No wheezing or rales  Abdominal:      General: Bowel sounds are normal  There is no distension  Palpations: Abdomen is soft  Tenderness: There is no abdominal tenderness  There is no rebound  Musculoskeletal: Normal range of motion  General: No tenderness or deformity  Skin:     General: Skin is warm and dry  Coloration: Skin is not pale  Findings: Rash present  No erythema        Comments: Erythema dorusm of feet b/l distal     Neurological:      Mental Status: She is alert and oriented to person, place, and time  Coordination: Coordination normal    Psychiatric:         Behavior: Behavior normal          Thought Content:  Thought content normal          Judgment: Judgment normal          Medications:    Current Outpatient Medications:     aspirin 81 mg chewable tablet, Chew, Disp: , Rfl:     atorvastatin (LIPITOR) 40 mg tablet, Take 40 mg by mouth daily, Disp: , Rfl:     bimatoprost (LUMIGAN) 0 01 % ophthalmic drops, Apply to eye, Disp: , Rfl:     Calcium Carbonate-Vit D-Min (CALCIUM 1200 PO), Take 1,200 mg by mouth, Disp: , Rfl:     Cholecalciferol (VITAMIN D) 50 MCG (2000 UT) tablet, Take 2,000 Units by mouth daily, Disp: , Rfl:     clopidogrel (PLAVIX) 75 mg tablet, Take 75 mg by mouth daily, Disp: , Rfl:     Empagliflozin (JARDIANCE) 10 MG TABS, Take 10 mg by mouth every morning, Disp: , Rfl:     glimepiride (AMARYL) 4 mg tablet, Take 4 mg by mouth every morning before breakfast, Disp: , Rfl:     Linagliptin (TRADJENTA) 5 MG TABS, Take by mouth, Disp: , Rfl:     metoprolol tartrate (LOPRESSOR) 25 mg tablet, Take 25 mg by mouth daily, Disp: , Rfl:     nitroglycerin (NITRODUR) 0 1 mg/hr, Place 1 patch on the skin daily, Disp: , Rfl:     pantoprazole (PROTONIX) 40 mg tablet, Take 40 mg by mouth daily, Disp: , Rfl:     ranolazine (RANEXA) 500 mg 12 hr tablet, Take 500 mg by mouth 2 (two) times a day, Disp: , Rfl:     repaglinide (PRANDIN) 1 mg tablet, Take 1 mg by mouth 3 (three) times a day before meals, Disp: , Rfl:     Laboratory Results:  Results from last 7 days   Lab Units 01/04/21  1233   POTASSIUM mmol/L 5 1   CHLORIDE mmol/L 100   CO2 mmol/L 32   BUN mg/dL 19   CREATININE mg/dL 1 08*   CALCIUM mg/dL 10 0       Results for orders placed or performed in visit on 01/04/21   Protein electrophoresis, serum   Result Value Ref Range    Protein, Total 6 9 6 1 - 8 1 g/dL    Albumin, Electrophoresis 4 3 3 8 - 4 8 g/dL    Alpha-1 Globulin 0 3 0 2 - 0 3 g/dL    Alpha-2 Globulin 1 0 (H) 0 5 - 0 9 g/dL    Beta 1 Globulin 0 5 0 4 - 0 6 g/dL    Beta 2 Globulin 0 3 0 2 - 0 5 g/dL    Gamma Globulin 0 6 (L) 0 8 - 1 7 g/dL    Interpretation       Selective protein loss pattern, suggestive of nephrotic syndrome   Comprehensive metabolic panel   Result Value Ref Range    Glucose, Random 111 (H) 65 - 99 mg/dL    BUN 19 7 - 25 mg/dL    Creatinine 1 08 (H) 0 60 - 0 93 mg/dL    eGFR Non  50 (L) > OR = 60 mL/min/1 73m2    eGFR  58 (L) > OR = 60 mL/min/1 73m2    SL AMB BUN/CREATININE RATIO 18 6 - 22 (calc)    Sodium 139 135 - 146 mmol/L    Potassium 5 1 3 5 - 5 3 mmol/L    Chloride 100 98 - 110 mmol/L    CO2 32 20 - 32 mmol/L    Calcium 10 0 8 6 - 10 4 mg/dL    Protein, Total 6 9 6 1 - 8 1 g/dL    Albumin 4 5 3 6 - 5 1 g/dL    Globulin 2 4 1 9 - 3 7 g/dL (calc)    Albumin/Globulin Ratio 1 9 1 0 - 2 5 (calc)    TOTAL BILIRUBIN 0 5 0 2 - 1 2 mg/dL    Alkaline Phosphatase 86 37 - 153 U/L    AST 16 10 - 35 U/L    ALT 11 6 - 29 U/L   [place]/Lambda Light Chains Free With Ratio, Serum   Result Value Ref Range    Ig Kappa Free Light Chain 19 1 3 3 - 19 4 mg/L    Ig Lambda Free Light Chain 8 2 5 7 - 26 3 mg/L    Kappa/Lambda FluidC Ratio 2 33 (H) 0 26 - 1 65

## 2021-01-27 DIAGNOSIS — Z23 ENCOUNTER FOR IMMUNIZATION: ICD-10-CM

## 2021-02-03 ENCOUNTER — IMMUNIZATIONS (OUTPATIENT)
Dept: FAMILY MEDICINE CLINIC | Facility: HOSPITAL | Age: 76
End: 2021-02-03

## 2021-02-03 DIAGNOSIS — Z23 ENCOUNTER FOR IMMUNIZATION: Primary | ICD-10-CM

## 2021-02-03 PROCEDURE — 91300 SARS-COV-2 / COVID-19 MRNA VACCINE (PFIZER-BIONTECH) 30 MCG: CPT

## 2021-02-03 PROCEDURE — 0001A SARS-COV-2 / COVID-19 MRNA VACCINE (PFIZER-BIONTECH) 30 MCG: CPT

## 2021-02-11 LAB
CREAT ?TM UR-SCNC: 75 UMOL/L
EXT MICROALBUMIN URINE RANDOM: 0.4
HBA1C MFR BLD HPLC: 6.6 %
MICROALBUMIN/CREAT UR: 5 MG/G{CREAT}

## 2021-02-17 NOTE — PROGRESS NOTES
Please let pt know that potassium upper limit of nl 5 2  Please advise pt to continue low potassium diet   Please send pt list of foods with high and low potassium    Repeat BMP in 2 weeks    Thank you    np

## 2021-02-18 ENCOUNTER — TELEPHONE (OUTPATIENT)
Dept: NEPHROLOGY | Facility: CLINIC | Age: 76
End: 2021-02-18

## 2021-02-18 DIAGNOSIS — E87.5 HYPERKALEMIA: Primary | ICD-10-CM

## 2021-02-18 NOTE — TELEPHONE ENCOUNTER
Message left on patient's voicemail to follow low K diet and repeat BMP in two weeks per Dr Corinne Hoar    Low K diet and lab order mailed to pt       ----- Message from Nani Bradshaw MD sent at 2/17/2021  1:11 PM EST -----      ----- Message -----  From: Sebastian Lockwood  Sent: 2/16/2021   9:24 AM EST  To: Nani Bradshaw MD      ----- Message -----  From: Alley Goyal  Sent: 2/16/2021   8:30 AM EST  To: Nephrology [address] [place] [address] Drive

## 2021-02-23 ENCOUNTER — DOCUMENTATION (OUTPATIENT)
Dept: NEPHROLOGY | Facility: CLINIC | Age: 76
End: 2021-02-23

## 2021-02-23 LAB
EXT GLUCOSE BLD: 140
EXTERNAL ALBUMIN: 4.6
EXTERNAL ALK PHOS: 89
EXTERNAL ALT: 11
EXTERNAL AST: 17
EXTERNAL BICARBONATE: 30
EXTERNAL BUN: 19
EXTERNAL CALCIUM: 10
EXTERNAL CHLORIDE: 102
EXTERNAL CREATININE: 1.3
EXTERNAL EGFR: 40
EXTERNAL POTASSIUM: 5.2
EXTERNAL SODIUM: 138
HCT VFR BLD AUTO: 12.6 % (ref 34.8–46.1)
HGB BLD-MCNC: 12.6 G/DL (ref 11.5–15.4)
PLATELET # BLD AUTO: 356 THOUSANDS/UL (ref 149–390)
WBC # BLD AUTO: 5.2 THOUSAND/UL

## 2021-02-25 ENCOUNTER — IMMUNIZATIONS (OUTPATIENT)
Dept: FAMILY MEDICINE CLINIC | Facility: HOSPITAL | Age: 76
End: 2021-02-25

## 2021-02-25 DIAGNOSIS — Z23 ENCOUNTER FOR IMMUNIZATION: Primary | ICD-10-CM

## 2021-02-25 PROCEDURE — 91300 SARS-COV-2 / COVID-19 MRNA VACCINE (PFIZER-BIONTECH) 30 MCG: CPT

## 2021-02-25 PROCEDURE — 0002A SARS-COV-2 / COVID-19 MRNA VACCINE (PFIZER-BIONTECH) 30 MCG: CPT

## 2021-05-03 ENCOUNTER — HOSPITAL ENCOUNTER (OUTPATIENT)
Dept: RADIOLOGY | Facility: HOSPITAL | Age: 76
Discharge: HOME | End: 2021-05-03
Attending: INTERNAL MEDICINE
Payer: MEDICARE

## 2021-05-03 DIAGNOSIS — N18.2 CKD (CHRONIC KIDNEY DISEASE), STAGE II: ICD-10-CM

## 2021-05-03 DIAGNOSIS — N28.1 RENAL CYST: ICD-10-CM

## 2021-05-03 PROCEDURE — 76770 US EXAM ABDO BACK WALL COMP: CPT

## 2021-05-06 ENCOUNTER — TELEPHONE (OUTPATIENT)
Dept: NEPHROLOGY | Facility: CLINIC | Age: 76
End: 2021-05-06

## 2021-05-06 NOTE — RESULT ENCOUNTER NOTE
Hello    Patient normally is followed up by Ms Chaitanya Olivier         Can you please help the patient set up a urology  Appointment for evaluation of renal cyst   This is not urgent so can be in the next 1-2 months     Also patient needs to be set up to see us in June or July     she is going for blood work next week - she already has lab work slips     she is aware of the above    Thank you    np

## 2021-05-06 NOTE — TELEPHONE ENCOUNTER
Spoke with the patient  Reviewed ultrasound findings with possible complicated cyst   I will refer the patient to the urologist for follow-up  Patient agreeable  Task request sent

## 2021-05-07 ENCOUNTER — APPOINTMENT (EMERGENCY)
Dept: RADIOLOGY | Facility: HOSPITAL | Age: 76
End: 2021-05-07
Attending: EMERGENCY MEDICINE
Payer: MEDICARE

## 2021-05-07 ENCOUNTER — APPOINTMENT (EMERGENCY)
Dept: RADIOLOGY | Facility: HOSPITAL | Age: 76
End: 2021-05-07
Payer: MEDICARE

## 2021-05-07 ENCOUNTER — TELEPHONE (OUTPATIENT)
Dept: UROLOGY | Facility: AMBULATORY SURGERY CENTER | Age: 76
End: 2021-05-07

## 2021-05-07 ENCOUNTER — HOSPITAL ENCOUNTER (EMERGENCY)
Facility: HOSPITAL | Age: 76
Discharge: HOME | End: 2021-05-07
Attending: EMERGENCY MEDICINE | Admitting: EMERGENCY MEDICINE
Payer: MEDICARE

## 2021-05-07 ENCOUNTER — TELEPHONE (OUTPATIENT)
Dept: NEPHROLOGY | Facility: CLINIC | Age: 76
End: 2021-05-07

## 2021-05-07 VITALS
SYSTOLIC BLOOD PRESSURE: 184 MMHG | TEMPERATURE: 97.7 F | RESPIRATION RATE: 20 BRPM | OXYGEN SATURATION: 98 % | WEIGHT: 122 LBS | DIASTOLIC BLOOD PRESSURE: 81 MMHG | BODY MASS INDEX: 20.3 KG/M2 | HEART RATE: 96 BPM

## 2021-05-07 DIAGNOSIS — S01.01XA LACERATION OF SCALP, INITIAL ENCOUNTER: Primary | ICD-10-CM

## 2021-05-07 DIAGNOSIS — S09.90XA CLOSED HEAD INJURY, INITIAL ENCOUNTER: ICD-10-CM

## 2021-05-07 DIAGNOSIS — N28.1 RENAL CYST: Primary | ICD-10-CM
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
## 2021-05-07 PROCEDURE — 12013 RPR F/E/E/N/L/M 2.6-5.0 CM: CPT | Performed by: EMERGENCY MEDICINE

## 2021-05-07 PROCEDURE — 70486 CT MAXILLOFACIAL W/O DYE: CPT

## 2021-05-07 PROCEDURE — 72125 CT NECK SPINE W/O DYE: CPT

## 2021-05-07 PROCEDURE — G1004 CDSM NDSC: HCPCS

## 2021-05-07 PROCEDURE — 99284 EMERGENCY DEPT VISIT MOD MDM: CPT

## 2021-05-07 PROCEDURE — 73030 X-RAY EXAM OF SHOULDER: CPT

## 2021-05-07 PROCEDURE — 99284 EMERGENCY DEPT VISIT MOD MDM: CPT | Performed by: EMERGENCY MEDICINE

## 2021-05-07 PROCEDURE — 70450 CT HEAD/BRAIN W/O DYE: CPT

## 2021-05-07 RX ORDER — LIDOCAINE HYDROCHLORIDE AND EPINEPHRINE 10; 10 MG/ML; UG/ML
5 INJECTION, SOLUTION INFILTRATION; PERINEURAL ONCE
Status: COMPLETED | OUTPATIENT
Start: 2021-05-07 | End: 2021-05-07

## 2021-05-07 RX ORDER — NITROGLYCERIN 0.4 MG/1
0.4 TABLET SUBLINGUAL
COMMUNITY

## 2021-05-07 RX ORDER — CYCLOSPORINE 0.5 MG/ML
1 EMULSION OPHTHALMIC 2 TIMES DAILY
COMMUNITY

## 2021-05-07 RX ORDER — NAPROXEN 500 MG/1
500 TABLET ORAL 2 TIMES DAILY WITH MEALS
COMMUNITY
End: 2021-06-18

## 2021-05-07 RX ORDER — BACITRACIN, NEOMYCIN, POLYMYXIN B 400; 3.5; 5 [USP'U]/G; MG/G; [USP'U]/G
1 OINTMENT TOPICAL ONCE
Status: COMPLETED | OUTPATIENT
Start: 2021-05-07 | End: 2021-05-07

## 2021-05-07 RX ADMIN — BACITRACIN, NEOMYCIN, POLYMYXIN B 1 SMALL APPLICATION: 400; 3.5; 5 OINTMENT TOPICAL at 23:33

## 2021-05-07 RX ADMIN — LIDOCAINE HYDROCHLORIDE,EPINEPHRINE BITARTRATE 5 ML: 10; .01 INJECTION, SOLUTION INFILTRATION; PERINEURAL at 22:49

## 2021-05-07 NOTE — TELEPHONE ENCOUNTER
Reason for appointment/Complaint/Diagnosis : Renal cyst being referred by Nephrology routine      Insurance: Medicare and [employer]et[employer]      History of Cancer? no                    If yes, what kind? n/a    Previous urologist?  no               Records requested/where?  no  Outside testing/where?no    Location Preference for office visit?  [place]

## 2021-05-07 NOTE — TELEPHONE ENCOUNTER
Spoke with the urology office, they will call pt directly to schedule  Pt was made aware of the above

## 2021-05-07 NOTE — TELEPHONE ENCOUNTER
US done 5/3/21 via nephrology shows:  Right kidney  Normal echogenicity and contour  There is a questionable complicated cyst or hypoechoic lesion in the right upper pole measuring 2 4 x 1 4 cm seen only on volumetric sweeps image 82 of 136  Suggest further characterization with contrast CT versus follow-up surveillance ultrasound in 6   Months  This can be next available consult with [facility]

## 2021-05-07 NOTE — TELEPHONE ENCOUNTER
----- Message from Umer Pastor MD sent at 5/6/2021  4:54 PM EDT -----  Hello    Patient normally is followed up by Ms Candida Crowley         Can you please help the patient set up a urology  Appointment for evaluation of renal cyst   This is not urgent so can be in the next 1-2 months     Also patient needs to be set up to see us in June or July     she is going for blood work next week - she already has lab work slips     she is aware of the above    Thank you    np

## 2021-05-08 NOTE — ED NOTES
Patient reports carrying her trash can out that has a lid that comes up, she hit her right forehead and right upper cheek on the lid  Patient is on blood thinners, denies LOC    Given wet washcloth to clean her hands and new guaze applied to the gaping laceration which is still bleeding     Sohan Castellanos RN  05/07/21 [id number]

## 2021-05-08 NOTE — ED NOTES
Sutured laceration area cleaned and Neosporin applied  Sutures covered with 4X4 tape    Patient tolerated well     Denae Funez RN  05/07/21 [id number]

## 2021-05-08 NOTE — ED PROVIDER NOTES
History  Chief Complaint   Patient presents with    Head Injury     states tripped over the lip of the sliding glass door, fell and hit head on garbage can  on plavix, also hit cheek and c/o R arm pain  hard to control bleeding from scalp     HPI    77 yo F presents to the ed for eval after a fall  Patient tripped over lip of sliding door  Fell onto forehead  Has right cheek pain, right shoulder pain,forehead lac  5/10 localized non radiating  On asa plavix  No loc  No other complaints on ROS>      Tetanus Up To date: 2019    Patient declined to take her clothes off states she has only a head lac and right shoulder pain    Prior to Admission Medications   Prescriptions Last Dose Informant Patient Reported? Taking?    Calcium Carbonate-Vit D-Min (CALCIUM 1200 PO) Not Taking at Unknown time Self Yes No   Sig: Take 1,200 mg by mouth   Cholecalciferol (VITAMIN D) 50 MCG (2000 UT) tablet Not Taking at Unknown time Self Yes No   Sig: Take 2,000 Units by mouth daily   Empagliflozin (JARDIANCE) 10 MG TABS  Self Yes No   Sig: Take 10 mg by mouth every morning   Insulin Degludec (TRESIBA SC)   Yes Yes   Sig: Inject 10 Units under the skin every morning   Linagliptin (TRADJENTA) 5 MG TABS  Self Yes No   Sig: Take by mouth   Nutritional Supplements (VITAMIN D BOOSTER PO)   Yes Yes   Sig: Take by mouth daily   aspirin 81 mg chewable tablet  Self Yes No   Sig: Chew   atorvastatin (LIPITOR) 40 mg tablet  Self Yes No   Sig: Take 40 mg by mouth daily   bimatoprost (LUMIGAN) 0 01 % ophthalmic drops  Self Yes No   Sig: Apply to eye   clopidogrel (PLAVIX) 75 mg tablet  Self Yes No   Sig: Take 75 mg by mouth daily   cycloSPORINE (RESTASIS) 0 05 % ophthalmic emulsion   Yes Yes   Sig: Administer 1 drop to both eyes 2 (two) times a day   glimepiride (AMARYL) 4 mg tablet  Self Yes No   Sig: Take 1 mg by mouth every morning before breakfast    metoprolol tartrate (LOPRESSOR) 25 mg tablet  Self Yes No   Sig: Take 25 mg by mouth daily naproxen (NAPROSYN) 500 mg tablet   Yes Yes   Sig: Take 500 mg by mouth 2 (two) times a day with meals   nitroglycerin (NITRODUR) 0 1 mg/hr Not Taking at Unknown time Self Yes No   Sig: Place 1 patch on the skin daily   nitroglycerin (NITROSTAT) 0 4 mg SL tablet   Yes Yes   Sig: Place 0 4 mg under the tongue every 5 (five) minutes as needed for chest pain   pantoprazole (PROTONIX) 40 mg tablet  Self Yes No   Sig: Take 40 mg by mouth daily   ranolazine (RANEXA) 500 mg 12 hr tablet  Self Yes No   Sig: Take 1,000 mg by mouth 2 (two) times a day    repaglinide (PRANDIN) 1 mg tablet  Self Yes No   Sig: Take 1 mg by mouth 3 (three) times a day before meals PRN      Facility-Administered Medications: None       Past Medical History:   Diagnosis Date    Coronary artery disease     [facility]     [facility]abetic neuropathy ([facility] )     Hypertension        Past Surgical History:   Procedure Laterality Date    BREAST BIOPSY      CARDIAC SURGERY      CARPAL TUNNEL RELEASE Bilateral     CATARACT EXTRACTION       SECTION      PATELLA SURGERY      ROTATOR CUFF REPAIR         History reviewed  No pertinent family history  I have reviewed and agree with the history as documented  E-Cigarette/Vaping    E-Cigarette Use Never User      E-Cigarette/Vaping Substances     Social History     Tobacco Use    Smoking status: Former Smoker    Smokeless tobacco: Never Used    Tobacco comment: quit 40 years ago   Substance Use Topics    Alcohol use: No    Drug use: No       Review of Systems   Constitutional: Negative for chills, fatigue and fever  HENT: Negative for sore throat  Eyes: Negative for redness and visual disturbance  Respiratory: Negative for cough and shortness of breath  Cardiovascular: Negative for chest pain  Gastrointestinal: Negative for abdominal pain, diarrhea and nausea  Genitourinary: Negative for difficulty urinating, dysuria and pelvic pain     Musculoskeletal: Positive for arthralgias  Negative for back pain  Skin: Positive for wound  Negative for rash  Neurological: Negative for syncope, weakness and headaches  All other systems reviewed and are negative  Physical Exam  Physical Exam  Vitals signs and nursing note reviewed  Constitutional:       General: She is not in acute distress  HENT:      Head: Normocephalic  Comments: 3 cm forehead lac  Eyes:      Conjunctiva/sclera: Conjunctivae normal    Neck:      Musculoskeletal: Normal range of motion  Cardiovascular:      Rate and Rhythm: Normal rate and regular rhythm  Heart sounds: Normal heart sounds  Pulmonary:      Effort: Pulmonary effort is normal  No respiratory distress  Breath sounds: Normal breath sounds  Abdominal:      General: Bowel sounds are normal       Palpations: Abdomen is soft  Tenderness: There is no abdominal tenderness  Musculoskeletal: Normal range of motion  Comments: No midline c t l spine tenderness  Abrasion on right shoulder  On examination: rigth shoulder Patient has full ROM  No laxity of the joint  Distally neurovascularly in tact  Compartments soft    Tenderness on palpation of humerus       Skin:     General: Skin is warm and dry  Findings: No rash  Neurological:      Mental Status: She is alert and oriented to person, place, and time  Cranial Nerves: No cranial nerve deficit  Sensory: No sensory deficit  Motor: No abnormal muscle tone        Coordination: Coordination normal          Vital Signs  ED Triage Vitals [05/07/21 2059]   Temperature Pulse Respirations Blood Pressure SpO2   97 7 °F (36 5 °C) 96 20 (!) 184/81 98 %      Temp Source Heart Rate Source Patient Position - Orthostatic VS BP Location FiO2 (%)   Tympanic Monitor Sitting Right arm --      Pain Score       7           Vitals:    05/07/21 2059   BP: (!) 184/81   Pulse: 96   Patient Position - Orthostatic VS: Sitting         Visual Acuity      ED Medications  Medications lidocaine-epinephrine (XYLOCAINE/EPINEPHRINE) 1 %-1:100,000 injection 5 mL (5 mL Infiltration Given by Other 5/7/21 2217)   neomycin-bacitracin-polymyxin b (NEOSPORIN) ointment 1 small application (1 small application Topical Given 5/7/21 4635)       Diagnostic Studies  Results Reviewed     None                 CT facial bones without contrast   Final Result by Pinkie Lesch, MD (05/07 2252)      No evidence of acute traumatic injury to the facial bones  Workstation performed: [id number]         CT cervical spine without contrast   Final Result by Pinkie Lesch, MD (05/07 2246)      No cervical spine fracture or traumatic malalignment  Workstation performed: [id number]         CT head without contrast   Final Result by Pinkie Lesch, MD (05/07 2250)      1  No acute intracranial abnormality  2   Right frontal scalp laceration and swelling  No calvarial fracture  Workstation performed: [id number]         XR shoulder 2+ views RIGHT    (Results Pending)              Procedures  Laceration repair    Date/Time: 5/8/2021 12:18 AM  Performed by: Tanner Daniel MD  Authorized by: Tanner Daniel MD   Consent: Verbal consent obtained  Risks and benefits: risks, benefits and alternatives were discussed  Consent given by: patient  Patient understanding: patient states understanding of the procedure being performed  Patient consent: the patient's understanding of the procedure matches consent given  Patient identity confirmed: verbally with patient  Time out: Immediately prior to procedure a "time out" was called to verify the correct patient, procedure, equipment, support staff and site/side marked as required    Body area: head/neck  Location details: forehead  Laceration length: 3 cm  Anesthesia: local infiltration    Anesthesia:  Local Anesthetic: lidocaine 1% with epinephrine  Anesthetic total: 3 mL    Wound Dehiscence:  Superficial Wound Dehiscence: simple closure      Procedure Details:  Preparation: Patient was prepped and draped in the usual sterile fashion  Irrigation solution: saline  Irrigation method: syringe  Amount of cleaning: extensive  Debridement: none  Degree of undermining: none  Skin closure: Ethilon (4-0)  Number of sutures: 6  Approximation: close  Approximation difficulty: simple  Dressing: 4x4 sterile gauze and antibiotic ointment  Patient tolerance: Patient tolerated the procedure well with no immediate complications               ED Course  ED Course as of May 08 0019   Fri May 07, 2021   2201 Sutures placed, 6, prior to CT scan due to bleeding              [facility]    Fall  Closed head injury 3 cm lac  Patient had lac repaired  Normal imaging of head neck and shoulder  Discharged with pcp follow up for suture removal 1 week  The patient was instructed to follow up as documented  Strict return precautions were discussed with the patient and the patient was instructed to return to the emergency department immediately if symptoms worsen  The patient/patient family member acknowledged and were in agreement with plan  Disposition  Final diagnoses:   Fall, initial encounter   Closed head injury, initial encounter   Laceration of scalp, initial encounter     Time reflects when diagnosis was documented in both MDM as applicable and the Disposition within this note     Time User Action Codes Description Comment    5/7/2021 11:04 PM Durene Schlichter Add [facility]  ADNK] Fall, initial encounter     5/7/2021 11:04 PM Durene Schlichter Add [S09 90XA] Closed head injury, initial encounter     5/7/2021 11:04 PM Durene Schlichter Add [S01 01XA] Laceration of scalp, initial encounter     5/7/2021 11:04 PM Durene Schlichter Modify [facility]  MLLJ] Fall, initial encounter     5/7/2021 11:04 PM Harveyene Schlichter Modify [S01 01XA] Laceration of scalp, initial encounter       ED Disposition     ED Disposition Condition Date/Time Comment    Discharge Stable Fri May 7, 2021 11:04 PM Cheryl Valdovinos discharge to home/self care              Follow-up Information     Follow up With Specialties Details Why Contact Info    Russel George DO  Schedule an appointment as soon as possible for a visit in 1 week For wound re-check, For suture removal [address] [place] [address]  [address] [place] [address] Road  [phone]            Discharge Medication List as of 5/7/2021 11:04 PM      CONTINUE these medications which have NOT CHANGED    Details   cycloSPORINE (RESTASIS) 0 05 % ophthalmic emulsion Administer 1 drop to both eyes 2 (two) times a day, Historical Med      Insulin Degludec (TRESIBA SC) Inject 10 Units under the skin every morning, Historical Med      naproxen (NAPROSYN) 500 mg tablet Take 500 mg by mouth 2 (two) times a day with meals, Historical Med      nitroglycerin (NITROSTAT) 0 4 mg SL tablet Place 0 4 mg under the tongue every 5 (five) minutes as needed for chest pain, Historical Med      Nutritional Supplements (VITAMIN D BOOSTER PO) Take by mouth daily, Historical Med      aspirin 81 mg chewable tablet Chew, Historical Med      atorvastatin (LIPITOR) 40 mg tablet Take 40 mg by mouth daily, Historical Med      bimatoprost (LUMIGAN) 0 01 % ophthalmic drops Apply to eye, Starting Fri 10/25/2013, Historical Med      Calcium Carbonate-Vit D-Min (CALCIUM 1200 PO) Take 1,200 mg by mouth, Historical Med      Cholecalciferol (VITAMIN D) 50 MCG (2000 UT) tablet Take 2,000 Units by mouth daily, Historical Med      clopidogrel (PLAVIX) 75 mg tablet Take 75 mg by mouth daily, Historical Med      Empagliflozin (JARDIANCE) 10 MG TABS Take 10 mg by mouth every morning, Historical Med      glimepiride (AMARYL) 4 mg tablet Take 1 mg by mouth every morning before breakfast , Historical Med      Linagliptin (TRADJENTA) 5 MG TABS Take by mouth, Historical Med      metoprolol tartrate (LOPRESSOR) 25 mg tablet Take 25 mg by mouth daily, Historical Med      nitroglycerin (NITRODUR) 0 1 mg/hr Place 1 patch on the skin daily, Historical Med      pantoprazole (PROTONIX) 40 mg tablet Take 40 mg by mouth daily, Historical Med      ranolazine (RANEXA) 500 mg 12 hr tablet Take 1,000 mg by mouth 2 (two) times a day , Historical Med      repaglinide (PRANDIN) 1 mg tablet Take 1 mg by mouth 3 (three) times a day before meals PRN, Historical Med           No discharge procedures on file      PDMP Review     None          ED Provider  Electronically Signed by           Cassandra Martini MD  05/08/21 8981

## 2021-05-12 LAB
APPEARANCE UR: CLEAR
BACTERIA UR QL AUTO: ABNORMAL /HPF
BASOPHILS # BLD AUTO: 30 CELLS/UL (ref 0–200)
BASOPHILS NFR BLD AUTO: 0.4 %
BILIRUB UR QL STRIP: NEGATIVE
BUN SERPL-MCNC: 21 MG/DL (ref 7–25)
BUN/CREAT SERPL: 20 (CALC) (ref 6–22)
CALCIUM SERPL-MCNC: 9.6 MG/DL (ref 8.6–10.4)
CALCIUM SERPL-MCNC: 9.6 MG/DL (ref 8.6–10.4)
CHLORIDE SERPL-SCNC: 102 MMOL/L (ref 98–110)
CO2 SERPL-SCNC: 28 MMOL/L (ref 20–32)
COLOR UR: YELLOW
CREAT SERPL-MCNC: 1.06 MG/DL (ref 0.6–0.93)
CREAT UR-MCNC: 68 MG/DL (ref 20–275)
EOSINOPHIL # BLD AUTO: 8 CELLS/UL (ref 15–500)
EOSINOPHIL NFR BLD AUTO: 0.1 %
ERYTHROCYTE [DISTWIDTH] IN BLOOD BY AUTOMATED COUNT: 13.5 % (ref 11–15)
GLUCOSE SERPL-MCNC: 116 MG/DL (ref 65–99)
GLUCOSE UR QL STRIP: ABNORMAL
HCT VFR BLD AUTO: 33.6 % (ref 35–45)
HGB BLD-MCNC: 11.3 G/DL (ref 11.7–15.5)
HGB UR QL STRIP: NEGATIVE
HYALINE CASTS #/AREA URNS LPF: ABNORMAL /LPF
KAPPA LC FREE SER-MCNC: 18.8 MG/L (ref 3.3–19.4)
KAPPA LC FREE/LAMBDA FREE SER: 1.47 {RATIO} (ref 0.26–1.65)
KETONES UR QL STRIP: NEGATIVE
LAMBDA LC FREE SERPL-MCNC: 12.8 MG/L (ref 5.7–26.3)
LEUKOCYTE ESTERASE UR QL STRIP: NEGATIVE
LYMPHOCYTES # BLD AUTO: 844 CELLS/UL (ref 850–3900)
LYMPHOCYTES NFR BLD AUTO: 11.1 %
MAGNESIUM SERPL-MCNC: 2.2 MG/DL (ref 1.5–2.5)
MCH RBC QN AUTO: 30.6 PG (ref 27–33)
MCHC RBC AUTO-ENTMCNC: 33.6 G/DL (ref 32–36)
MCV RBC AUTO: 91.1 FL (ref 80–100)
MONOCYTES # BLD AUTO: 433 CELLS/UL (ref 200–950)
MONOCYTES NFR BLD AUTO: 5.7 %
NEUTROPHILS # BLD AUTO: 6285 CELLS/UL (ref 1500–7800)
NEUTROPHILS NFR BLD AUTO: 82.7 %
NITRITE UR QL STRIP: NEGATIVE
PH UR STRIP: 5.5 [PH] (ref 5–8)
PHOSPHATE SERPL-MCNC: 4.2 MG/DL (ref 2.1–4.3)
PLATELET # BLD AUTO: 362 THOUSAND/UL (ref 140–400)
PMV BLD REES-ECKER: 9.2 FL (ref 7.5–12.5)
POTASSIUM SERPL-SCNC: 4.7 MMOL/L (ref 3.5–5.3)
PROT UR QL STRIP: NEGATIVE
PROT UR-MCNC: 8 MG/DL (ref 5–24)
PROT/CREAT UR: 0.12 MG/MG CREAT (ref 0.02–0.16)
PROT/CREAT UR: 118 MG/G CREAT (ref 21–161)
PTH-INTACT SERPL-MCNC: 79 PG/ML (ref 14–64)
RBC # BLD AUTO: 3.69 MILLION/UL (ref 3.8–5.1)
RBC #/AREA URNS HPF: ABNORMAL /HPF
SL AMB EGFR AFRICAN AMERICAN: 59 ML/MIN/1.73M2
SL AMB EGFR NON AFRICAN AMERICAN: 51 ML/MIN/1.73M2
SODIUM SERPL-SCNC: 138 MMOL/L (ref 135–146)
SP GR UR STRIP: 1.02 (ref 1–1.03)
SQUAMOUS #/AREA URNS HPF: ABNORMAL /HPF
WBC # BLD AUTO: 7.6 THOUSAND/UL (ref 3.8–10.8)
WBC #/AREA URNS HPF: ABNORMAL /HPF

## 2021-05-12 NOTE — RESULT ENCOUNTER NOTE
Hello    Patient normally is followed up by Ms Yee Stanford  Please let the patient know that the renal function is stable  The kappa lambda ratio is not elevated  Will monitor for now  No changes for now      Thank you    np

## 2021-05-13 ENCOUNTER — TELEPHONE (OUTPATIENT)
Dept: NEPHROLOGY | Facility: CLINIC | Age: 76
End: 2021-05-13

## 2021-05-13 NOTE — TELEPHONE ENCOUNTER
Pt advised that renal function is stable, Kappa lamda not elevated; will continue to monitor per Dr Chirag Samano  No changes  ----- Message from Tushar Feldman MD sent at 5/12/2021  4:42 PM EDT -----  Hello    Patient normally is followed up by Ms Francisca Jonas  Please let the patient know that the renal function is stable  The kappa lambda ratio is not elevated  Will monitor for now  No changes for now      Thank you    np

## 2021-06-17 ENCOUNTER — TELEPHONE (OUTPATIENT)
Dept: GASTROENTEROLOGY | Facility: CLINIC | Age: 76
End: 2021-06-17

## 2021-06-17 ENCOUNTER — PREP FOR PROCEDURE (OUTPATIENT)
Dept: GASTROENTEROLOGY | Facility: CLINIC | Age: 76
End: 2021-06-17

## 2021-06-17 DIAGNOSIS — Z86.010 HISTORY OF COLON POLYPS: ICD-10-CM

## 2021-06-17 DIAGNOSIS — K21.9 GASTROESOPHAGEAL REFLUX DISEASE WITHOUT ESOPHAGITIS: ICD-10-CM

## 2021-06-17 DIAGNOSIS — Z80.0 FAMILY HISTORY OF COLON CANCER: Primary | ICD-10-CM

## 2021-06-17 NOTE — TELEPHONE ENCOUNTER
Pt is rescheduling Colon that she had to [facility] last year  She states you verbally told her she can have an EGD also when she was with her  for his colonoscopy  She does have reflux symptoms  OK to schedule FLIP?

## 2021-06-18 ENCOUNTER — OFFICE VISIT (OUTPATIENT)
Dept: NEPHROLOGY | Facility: CLINIC | Age: 76
End: 2021-06-18
Payer: MEDICARE

## 2021-06-18 VITALS
HEART RATE: 56 BPM | SYSTOLIC BLOOD PRESSURE: 122 MMHG | DIASTOLIC BLOOD PRESSURE: 58 MMHG | WEIGHT: 121 LBS | BODY MASS INDEX: 20.16 KG/M2 | HEIGHT: 65 IN

## 2021-06-18 DIAGNOSIS — N18.30 CKD (CHRONIC KIDNEY DISEASE), STAGE III (HCC): Primary | ICD-10-CM

## 2021-06-18 PROCEDURE — 99213 OFFICE O/P EST LOW 20 MIN: CPT | Performed by: INTERNAL MEDICINE

## 2021-06-18 NOTE — PROGRESS NOTES
NEPHROLOGY OFFICE VISIT   Froilan Figueroa 76 y o  female MRN: [id number]  6/18/2021    Reason for Visit: CKD I-II    ASSESSMENT and PLAN:    I had the pleasure of seeing Ms Lakeisha Gutierrez today in the renal clinic for the continued management of CKD I-II  75 yo female with PMHx of DM (diagnosed 32 years ago), no retinopathy, HTN (diagnosed 32 years ago), HPL, CAD status post PCI in April 2019, patient appears to have had 4 cardiac catheterizations in 2019, ejection fraction improved to 60% in May 2019, vasovagal syncope in May, former smoker quit 46 years ago (smoked for 10 years), who presents for follow up evaluation for proteinuria, and CKD      5/2021 - fall and had head injury and was in ER       1) 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 with recent SCOTT     Patient may have had acute kidney injury in the setting of four cardiac catheterizations over the 3 months prior to last appt in July 2019      -creatinine 0 8-1 mg/dL in April 2019  -creatinine on 5/9/2019 was 1 27 per dL  -repeat creatinine on 05/23 was 1 09 mg/dL  - creatinine January 2021 was stable at 1 08 mg/dL  -  Most recent U PCR is 0 112 milligram/milligram in October of 2020  - urinalysis in October 2020 -6-10 epithelial cells, many calcium oxalate crystals, 3+ glucose  - A1c 7 2% in march 2019  - renal u/s from 6/5/19 - right kidney 9 3 cm, left kidney 9 7 cm, bilateral kidneys are lobulated diffusely, right kidney possible 3 mm calculus, left kidney simple cyst   -repeat renal ultrasound in June 2020  -SPEP-hypogammaglobulinemia  -UPEP-no monoclonal band (but states glomerular pattern, though mainly albumin)  - C3, C4 nl  - kappa/lamda ratio  Slightly rising  1 8 in October 2020   Repeat is slightly higher at 2 3  but repeat 5/11/2021 is nl 1 47  -  SPEP repeated in January 2021 with selective protein loss pattern suggestive of nephrotic syndrome      overall, patient does not have nephrotic syndrome, has normal albumin, has nonnephrotic range proteinuria  Has slightly rising kappa/lambda ratio which is improved       - check K/L ratio in 6-12 months (after next appt)  - f/u with Urology as doing regarding cyst  - labs in 5-6 months with appt thereafter  - no changes to meds  - no longer on naproxen (was prescribed this after head injury recently)      2) Hyperkalemia - given chronic issue, it is likely due to impaired urinary potassium excretion      -potassium stable 5/11/2021  - pt appears to have had hyperkalemia since 2015  -concern for RTA  -may consider low-dose thiazide diuretic if needed  - improved after spinach removed from salads (now eating more dianne lettuce     3) Anemia-f/u Hb per Primary Team     4) HTN - controlled     -monitor with metoprolol for now     5) acid/base     - recheck bicarb stable     6) microalbuminuria     -UPCR 118     7) elevated phos     -PTH 79  - monitor for now     It was a pleasure evaluating your patient in the office today  Thank you for allowing our team to participate in the care Urmila Barry  Please do not hesitate to contact our team if further issues/questions shall arise in the interim      No problem-specific Assessment & Plan notes found for this encounter  HPI:    Pt denies fevers, chills, nausea, vomiting, diarrhea  PATIENT INSTRUCTIONS:    Patient Instructions   1) Avoid NSAIDS - (Example - motrin, advil, ibuprofen, aleve, exederin, etc)  2) Always follow a low salt diet  3) please call if your Urology needs to do CT scan with contrast study so we can help schedule the CT with intravenous fluids  4) no changes to your medications  5) appointment in 5-6 months with labwork prior        OBJECTIVE:  Current Weight: Weight - Scale: 54 9 kg (121 lb)  Vitals:    06/18/21 1305   BP: 122/58   Pulse: 56   Weight: 54 9 kg (121 lb)   Height: 5' 5" (1 651 m)    Body mass index is 20 14 kg/m²  REVIEW OF SYSTEMS:    Review of Systems   Constitutional: Negative  Negative for fatigue  HENT: Negative  Eyes: Negative  Respiratory: Negative  Negative for shortness of breath  Cardiovascular: Negative  Negative for leg swelling  Gastrointestinal: Negative  Endocrine: Negative  Genitourinary: Negative  Negative for difficulty urinating  Musculoskeletal: Negative  Skin: Negative  Allergic/Immunologic: Negative  Neurological: Negative  Hematological: Negative  Psychiatric/Behavioral: Negative  All other systems reviewed and are negative  PHYSICAL EXAM:      Physical Exam  Vitals and nursing note reviewed  Constitutional:       General: She is not in acute distress  Appearance: She is well-developed  She is not diaphoretic  HENT:      Head: Normocephalic and atraumatic  Eyes:      General: No scleral icterus  Right eye: No discharge  Left eye: No discharge  Conjunctiva/sclera: Conjunctivae normal    Neck:      Vascular: No JVD  Cardiovascular:      Rate and Rhythm: Normal rate and regular rhythm  Heart sounds: No murmur heard  No friction rub  No gallop  Pulmonary:      Effort: Pulmonary effort is normal  No respiratory distress  Breath sounds: Normal breath sounds  No wheezing or rales  Abdominal:      General: Bowel sounds are normal  There is no distension  Palpations: Abdomen is soft  Tenderness: There is no abdominal tenderness  There is no rebound  Musculoskeletal:         General: No tenderness or deformity  Normal range of motion  Cervical back: Normal range of motion and neck supple  Skin:     General: Skin is warm and dry  Coloration: Skin is not pale  Findings: No erythema or rash  Neurological:      Mental Status: She is alert and oriented to person, place, and time  Coordination: Coordination normal    Psychiatric:         Behavior: Behavior normal          Thought Content:  Thought content normal          Judgment: Judgment normal          Medications:    Current Outpatient Medications:     aspirin 81 mg chewable tablet, Chew, Disp: , Rfl:     atorvastatin (LIPITOR) 40 mg tablet, Take 40 mg by mouth daily, Disp: , Rfl:     bimatoprost (LUMIGAN) 0 01 % ophthalmic drops, Apply to eye, Disp: , Rfl:     Calcium Carbonate-Vit D-Min (CALCIUM 1200 PO), Take 1,200 mg by mouth, Disp: , Rfl:     Cholecalciferol (VITAMIN D) 50 MCG (2000 UT) tablet, Take 2,000 Units by mouth daily, Disp: , Rfl:     clopidogrel (PLAVIX) 75 mg tablet, Take 75 mg by mouth daily, Disp: , Rfl:     cycloSPORINE (RESTASIS) 0 05 % ophthalmic emulsion, Administer 1 drop to both eyes 2 (two) times a day, Disp: , Rfl:     Empagliflozin (JARDIANCE) 10 MG TABS, Take 10 mg by mouth every morning, Disp: , Rfl:     glimepiride (AMARYL) 4 mg tablet, Take 1 mg by mouth every morning before breakfast , Disp: , Rfl:     Insulin Degludec (TRESIBA SC), Inject 10 Units under the skin every morning, Disp: , Rfl:     Linagliptin (TRADJENTA) 5 MG TABS, Take by mouth, Disp: , Rfl:     metoprolol tartrate (LOPRESSOR) 25 mg tablet, Take 25 mg by mouth daily, Disp: , Rfl:     nitroglycerin (NITROSTAT) 0 4 mg SL tablet, Place 0 4 mg under the tongue every 5 (five) minutes as needed for chest pain, Disp: , Rfl:     pantoprazole (PROTONIX) 40 mg tablet, Take 40 mg by mouth daily, Disp: , Rfl:     ranolazine (RANEXA) 500 mg 12 hr tablet, Take 1,000 mg by mouth 2 (two) times a day , Disp: , Rfl:     repaglinide (PRANDIN) 1 mg tablet, Take 1 mg by mouth 3 (three) times a day before meals PRN, Disp: , Rfl:     nitroglycerin (NITRODUR) 0 1 mg/hr, Place 1 patch on the skin daily, Disp: , Rfl:     Nutritional Supplements (VITAMIN D BOOSTER PO), Take by mouth daily (Patient not taking: Reported on 6/18/2021), Disp: , Rfl:     Laboratory Results:        Invalid input(s): ALBUMIN    Results for orders placed or performed in visit on 05/11/21   PTH, Intact and Calcium   Result Value Ref Range    PTH, Intact 79 (H) 14 - 64 pg/mL Calcium 9 6 8 6 - 10 4 mg/dL   Magnesium   Result Value Ref Range    Magnesium, Serum 2 2 1 5 - 2 5 mg/dL   Phosphorus   Result Value Ref Range    Phosphorus, Serum 4 2 2 1 - 4 3 mg/dL   Basic metabolic panel   Result Value Ref Range    Glucose, Random 116 (H) 65 - 99 mg/dL    BUN 21 7 - 25 mg/dL    Creatinine 1 06 (H) 0 60 - 0 93 mg/dL    eGFR Non  51 (L) > OR = 60 mL/min/1 73m2    eGFR  59 (L) > OR = 60 mL/min/1 73m2    SL AMB BUN/CREATININE RATIO 20 6 - 22 (calc)    Sodium 138 135 - 146 mmol/L    Potassium 4 7 3 5 - 5 3 mmol/L    Chloride 102 98 - 110 mmol/L    CO2 28 20 - 32 mmol/L    Calcium 9 6 8 6 - 10 4 mg/dL   Urinalysis with microscopic   Result Value Ref Range    Color UA YELLOW YELLOW    Urine Appearance CLEAR CLEAR    Specific Gravity 1 021 1 001 - 1 035    Ph 5 5 5 0 - 8 0    Glucose, Urine 3+ (A) NEGATIVE    Bilirubin, Urine NEGATIVE NEGATIVE    Ketone, Urine NEGATIVE NEGATIVE    Blood, Urine NEGATIVE NEGATIVE    Protein, Urine NEGATIVE NEGATIVE    SL AMB NITRITES URINE, QUAL   NEGATIVE NEGATIVE    Leukocyte Esterase NEGATIVE NEGATIVE    SL AMB WBC, URINE NONE SEEN < OR = 5 /HPF    RBC, Urine NONE SEEN < OR = 2 /HPF    Squamous Epithelial Cells 0-5 < OR = 5 /HPF    Bacteria, UA NONE SEEN NONE SEEN /HPF    Hyaline Casts NONE SEEN NONE SEEN /LPF   CBC and differential   Result Value Ref Range    White Blood Cell Count 7 6 3 8 - 10 8 Thousand/uL    Red Blood Cell Count 3 69 (L) 3 80 - 5 10 Million/uL    Hemoglobin 11 3 (L) 11 7 - 15 5 g/dL    HCT 33 6 (L) 35 0 - 45 0 %    MCV 91 1 80 0 - 100 0 fL    MCH 30 6 27 0 - 33 0 pg    MCHC 33 6 32 0 - 36 0 g/dL    RDW 13 5 11 0 - 15 0 %    Platelet Count 368 425 - 400 Thousand/uL    SL AMB MPV 9 2 7 5 - 12 5 fL    Neutrophils (Absolute) 6,285 1,500 - 7,800 cells/uL    Lymphocytes (Absolute) 844 (L) 850 - 3,900 cells/uL    Monocytes (Absolute) 433 200 - 950 cells/uL    Eosinophils (Absolute) 8 (L) 15 - 500 cells/uL    Basophils ABS 30 0 - 200 cells/uL    Neutrophils 82 7 %    Lymphocytes 11 1 %    Monocytes 5 7 %    Eosinophils 0 1 %    Basophils PCT 0 4 %   Kappa/Lambda Light Chains Free With Ratio, Serum   Result Value Ref Range    Ig Kappa Free Light Chain 18 8 3 3 - 19 4 mg/L    Ig Lambda Free Light Chain 12 8 5 7 - 26 3 mg/L    Kappa/Lambda FluidC Ratio 1 47 0 26 - 1 65   Protein, Total w/Creat, Random Urine   Result Value Ref Range    Creatinine, Urine 68 20 - 275 mg/dL    Protein/Creat Ratio 118 21 - 161 mg/g creat    Protein/Creat Ratio 0 118 0 021 - 0 161 mg/mg creat    Total Protein, Urine 8 5 - 24 mg/dL

## 2021-06-18 NOTE — LETTER
June 18, 2021     Nay Tong, [address]  [address] [place] [id number]    Patient: Donna Corado   YOB: 1945   Date of Visit: 6/18/2021       Dear Dr Severiano Caper: Thank you for referring Breana Benitez to me for evaluation  Below are my notes for this consultation  If you have questions, please do not hesitate to call me  I look forward to following your patient along with you  Sincerely,        Solomon Bryant MD        CC: No Recipients  Solomon Bryant MD  6/18/2021  1:48 PM  Sign when Signing Visit  NEPHROLOGY OFFICE VISIT   Donna Corado 76 y o  female MRN: [id number]  6/18/2021    Reason for Visit: CKD I-II    ASSESSMENT and PLAN:    I had the pleasure of seeing Ms Pj Nunez today in the renal clinic for the continued management of CKD I-II  77 yo female with PMHx of DM (diagnosed 32 years ago), no retinopathy, HTN (diagnosed 32 years ago), HPL, CAD status post PCI in April 2019, patient appears to have had 4 cardiac catheterizations in 2019, ejection fraction improved to 60% in May 2019, vasovagal syncope in May, former smoker quit 46 years ago (smoked for 10 years), who presents for follow up evaluation for proteinuria, and CKD      5/2021 - fall and had head injury and was in ER       1) 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 with recent SCOTT     Patient may have had acute kidney injury in the setting of four cardiac catheterizations over the 3 months prior to last appt in July 2019      -creatinine 0 8-1 mg/dL in April 2019  -creatinine on 5/9/2019 was 1 27 per dL  -repeat creatinine on 05/23 was 1 09 mg/dL  - creatinine January 2021 was stable at 1 08 mg/dL    -  Most recent U PCR is 0 112 milligram/milligram in October of 2020  - urinalysis in October 2020 -6-10 epithelial cells, many calcium oxalate crystals, 3+ glucose  - A1c 7 2% in march 2019  - renal u/s from 6/5/19 - right kidney 9 3 cm, left kidney 9 7 cm, bilateral kidneys are lobulated diffusely, right kidney possible 3 mm calculus, left kidney simple cyst   -repeat renal ultrasound in June 2020  -SPEP-hypogammaglobulinemia  -UPEP-no monoclonal band (but states glomerular pattern, though mainly albumin)  - C3, C4 nl  - kappa/lamda ratio  Slightly rising  1 8 in October 2020  Repeat is slightly higher at 2 3  but repeat 5/11/2021 is nl 1 47  -  SPEP repeated in January 2021 with selective protein loss pattern suggestive of nephrotic syndrome      overall, patient does not have nephrotic syndrome, has normal albumin, has nonnephrotic range proteinuria  Has slightly rising kappa/lambda ratio which is improved       - check K/L ratio in 6-12 months (after next appt)  - f/u with Urology as doing regarding cyst  - labs in 5-6 months with appt thereafter  - no changes to meds  - no longer on naproxen (was prescribed this after head injury recently)      2) Hyperkalemia - given chronic issue, it is likely due to impaired urinary potassium excretion      -potassium stable 5/11/2021  - pt appears to have had hyperkalemia since 2015  -concern for RTA  -may consider low-dose thiazide diuretic if needed  - improved after spinach removed from salads (now eating more dianne lettuce     3) Anemia-f/u Hb per Primary Team     4) HTN - controlled     -monitor with metoprolol for now     5) acid/base     - recheck bicarb stable     6) microalbuminuria     -UPCR 118     7) elevated phos     -PTH 79  - monitor for now     It was a pleasure evaluating your patient in the office today  Thank you for allowing our team to participate in the care Jacinto Mccloud  Please do not hesitate to contact our team if further issues/questions shall arise in the interim      No problem-specific Assessment & Plan notes found for this encounter  HPI:    Pt denies fevers, chills, nausea, vomiting, diarrhea       PATIENT INSTRUCTIONS:    Patient Instructions   1) Avoid NSAIDS - (Example - motrin, advil, ibuprofen, aleve, exederin, etc)  2) Always follow a low salt diet  3) please call if your Urology needs to do CT scan with contrast study so we can help schedule the CT with intravenous fluids  4) no changes to your medications  5) appointment in 5-6 months with labwork prior        OBJECTIVE:  Current Weight: Weight - Scale: 54 9 kg (121 lb)  Vitals:    06/18/21 1305   BP: 122/58   Pulse: 56   Weight: 54 9 kg (121 lb)   Height: 5' 5" (1 651 m)    Body mass index is 20 14 kg/m²  REVIEW OF SYSTEMS:    Review of Systems   Constitutional: Negative  Negative for fatigue  HENT: Negative  Eyes: Negative  Respiratory: Negative  Negative for shortness of breath  Cardiovascular: Negative  Negative for leg swelling  Gastrointestinal: Negative  Endocrine: Negative  Genitourinary: Negative  Negative for difficulty urinating  Musculoskeletal: Negative  Skin: Negative  Allergic/Immunologic: Negative  Neurological: Negative  Hematological: Negative  Psychiatric/Behavioral: Negative  All other systems reviewed and are negative  PHYSICAL EXAM:      Physical Exam  Vitals and nursing note reviewed  Constitutional:       General: She is not in acute distress  Appearance: She is well-developed  She is not diaphoretic  HENT:      Head: Normocephalic and atraumatic  Eyes:      General: No scleral icterus  Right eye: No discharge  Left eye: No discharge  Conjunctiva/sclera: Conjunctivae normal    Neck:      Vascular: No JVD  Cardiovascular:      Rate and Rhythm: Normal rate and regular rhythm  Heart sounds: No murmur heard  No friction rub  No gallop  Pulmonary:      Effort: Pulmonary effort is normal  No respiratory distress  Breath sounds: Normal breath sounds  No wheezing or rales  Abdominal:      General: Bowel sounds are normal  There is no distension  Palpations: Abdomen is soft  Tenderness: There is no abdominal tenderness   There is no rebound  Musculoskeletal:         General: No tenderness or deformity  Normal range of motion  Cervical back: Normal range of motion and neck supple  Skin:     General: Skin is warm and dry  Coloration: Skin is not pale  Findings: No erythema or rash  Neurological:      Mental Status: She is alert and oriented to person, place, and time  Coordination: Coordination normal    Psychiatric:         Behavior: Behavior normal          Thought Content:  Thought content normal          Judgment: Judgment normal          Medications:    Current Outpatient Medications:     aspirin 81 mg chewable tablet, Chew, Disp: , Rfl:     atorvastatin (LIPITOR) 40 mg tablet, Take 40 mg by mouth daily, Disp: , Rfl:     bimatoprost (LUMIGAN) 0 01 % ophthalmic drops, Apply to eye, Disp: , Rfl:     Calcium Carbonate-Vit D-Min (CALCIUM 1200 PO), Take 1,200 mg by mouth, Disp: , Rfl:     Cholecalciferol (VITAMIN D) 50 MCG (2000 UT) tablet, Take 2,000 Units by mouth daily, Disp: , Rfl:     clopidogrel (PLAVIX) 75 mg tablet, Take 75 mg by mouth daily, Disp: , Rfl:     cycloSPORINE (RESTASIS) 0 05 % ophthalmic emulsion, Administer 1 drop to both eyes 2 (two) times a day, Disp: , Rfl:     Empagliflozin (JARDIANCE) 10 MG TABS, Take 10 mg by mouth every morning, Disp: , Rfl:     glimepiride (AMARYL) 4 mg tablet, Take 1 mg by mouth every morning before breakfast , Disp: , Rfl:     Insulin Degludec (TRESIBA SC), Inject 10 Units under the skin every morning, Disp: , Rfl:     Linagliptin (TRADJENTA) 5 MG TABS, Take by mouth, Disp: , Rfl:     metoprolol tartrate (LOPRESSOR) 25 mg tablet, Take 25 mg by mouth daily, Disp: , Rfl:     nitroglycerin (NITROSTAT) 0 4 mg SL tablet, Place 0 4 mg under the tongue every 5 (five) minutes as needed for chest pain, Disp: , Rfl:     pantoprazole (PROTONIX) 40 mg tablet, Take 40 mg by mouth daily, Disp: , Rfl:     ranolazine (RANEXA) 500 mg 12 hr tablet, Take 1,000 mg by mouth 2 (two) times a day , Disp: , Rfl:     repaglinide (PRANDIN) 1 mg tablet, Take 1 mg by mouth 3 (three) times a day before meals PRN, Disp: , Rfl:     nitroglycerin (NITRODUR) 0 1 mg/hr, Place 1 patch on the skin daily, Disp: , Rfl:     Nutritional Supplements (VITAMIN D BOOSTER PO), Take by mouth daily (Patient not taking: Reported on 6/18/2021), Disp: , Rfl:     Laboratory Results:        Invalid input(s): ALBUMIN    Results for orders placed or performed in visit on 05/11/21   PTH, Intact and Calcium   Result Value Ref Range    PTH, Intact 79 (H) 14 - 64 pg/mL    Calcium 9 6 8 6 - 10 4 mg/dL   Magnesium   Result Value Ref Range    Magnesium, Serum 2 2 1 5 - 2 5 mg/dL   Phosphorus   Result Value Ref Range    Phosphorus, Serum 4 2 2 1 - 4 3 mg/dL   Basic metabolic panel   Result Value Ref Range    Glucose, Random 116 (H) 65 - 99 mg/dL    BUN 21 7 - 25 mg/dL    Creatinine 1 06 (H) 0 60 - 0 93 mg/dL    eGFR Non  51 (L) > OR = 60 mL/min/1 73m2    eGFR  59 (L) > OR = 60 mL/min/1 73m2    SL AMB BUN/CREATININE RATIO 20 6 - 22 (calc)    Sodium 138 135 - 146 mmol/L    Potassium 4 7 3 5 - 5 3 mmol/L    Chloride 102 98 - 110 mmol/L    CO2 28 20 - 32 mmol/L    Calcium 9 6 8 6 - 10 4 mg/dL   Urinalysis with microscopic   Result Value Ref Range    Color UA YELLOW YELLOW    Urine Appearance CLEAR CLEAR    Specific Gravity 1 021 1 001 - 1 035    Ph 5 5 5 0 - 8 0    Glucose, Urine 3+ (A) NEGATIVE    Bilirubin, Urine NEGATIVE NEGATIVE    Ketone, Urine NEGATIVE NEGATIVE    Blood, Urine NEGATIVE NEGATIVE    Protein, Urine NEGATIVE NEGATIVE    SL AMB NITRITES URINE, QUAL   NEGATIVE NEGATIVE    Leukocyte Esterase NEGATIVE NEGATIVE    SL AMB WBC, URINE NONE SEEN < OR = 5 /HPF    RBC, Urine NONE SEEN < OR = 2 /HPF    Squamous Epithelial Cells 0-5 < OR = 5 /HPF    Bacteria, UA NONE SEEN NONE SEEN /HPF    Hyaline Casts NONE SEEN NONE SEEN /LPF   CBC and differential   Result Value Ref Range    White Blood Cell Count 7 6 3 8 - 10 8 Thousand/uL    Red Blood Cell Count 3 69 (L) 3 80 - 5 10 Million/uL    Hemoglobin 11 3 (L) 11 7 - 15 5 g/dL    HCT 33 6 (L) 35 0 - 45 0 %    MCV 91 1 80 0 - 100 0 fL    MCH 30 6 27 0 - 33 0 pg    MCHC 33 6 32 0 - 36 0 g/dL    RDW 13 5 11 0 - 15 0 %    Platelet Count 510 360 - 400 Thousand/uL    SL AMB MPV 9 2 7 5 - 12 5 fL    Neutrophils (Absolute) 6,285 1,500 - 7,800 cells/uL    Lymphocytes (Absolute) 844 (L) 850 - 3,900 cells/uL    Monocytes (Absolute) 433 200 - 950 cells/uL    Eosinophils (Absolute) 8 (L) 15 - 500 cells/uL    Basophils ABS 30 0 - 200 cells/uL    Neutrophils 82 7 %    Lymphocytes 11 1 %    Monocytes 5 7 %    Eosinophils 0 1 %    Basophils PCT 0 4 %   Kappa/Lambda Light Chains Free With Ratio, Serum   Result Value Ref Range    Ig Kappa Free Light Chain 18 8 3 3 - 19 4 mg/L    Ig Lambda Free Light Chain 12 8 5 7 - 26 3 mg/L    Kappa/Lambda FluidC Ratio 1 47 0 26 - 1 65   Protein, Total w/Creat, Random Urine   Result Value Ref Range    Creatinine, Urine 68 20 - 275 mg/dL    Protein/Creat Ratio 118 21 - 161 mg/g creat    Protein/Creat Ratio 0 118 0 021 - 0 161 mg/mg creat    Total Protein, Urine 8 5 - 24 mg/dL

## 2021-06-18 NOTE — PATIENT INSTRUCTIONS
1) Avoid NSAIDS - (Example - motrin, advil, ibuprofen, aleve, exederin, etc)  2) Always follow a low salt diet  3) please call if your Urology needs to do CT scan with contrast study so we can help schedule the CT with intravenous fluids  4) no changes to your medications  5) appointment in 5-6 months with labwork prior

## 2021-06-22 NOTE — TELEPHONE ENCOUNTER
Received clearance from Dr Mily Malik  Patient may have procedure and stop antiplatelet 5 days prior to procedure  Please call patient to inform  Faxed clearance to [facility]  Thanks!

## 2021-07-19 ENCOUNTER — TELEPHONE (OUTPATIENT)
Dept: PREADMISSION TESTING | Facility: HOSPITAL | Age: 76
End: 2021-07-19

## 2021-07-19 VITALS — BODY MASS INDEX: 21.43 KG/M2 | HEIGHT: 63 IN

## 2021-07-19 NOTE — PRE-PROCEDURE INSTRUCTIONS
Pre-Surgery Instructions:   Medication Instructions    aspirin 81 mg chewable tablet Patient was instructed by Physician and understands   atorvastatin (LIPITOR) 40 mg tablet Patient was instructed by Physician and understands   bimatoprost (LUMIGAN) 0 01 % ophthalmic drops Patient was instructed by Physician and understands   Calcium Carbonate-Vit D-Min (CALCIUM 1200 PO) Patient was instructed by Physician and understands   Cholecalciferol (VITAMIN D) 50 MCG (2000 UT) tablet Patient was instructed by Physician and understands   clopidogrel (PLAVIX) 75 mg tablet Instructed patient per Anesthesia Guidelines   cycloSPORINE (RESTASIS) 0 05 % ophthalmic emulsion Patient was instructed by Physician and understands   Empagliflozin (JARDIANCE) 10 MG TABS Patient was instructed by Physician and understands   glimepiride (AMARYL) 4 mg tablet Patient was instructed by Physician and understands   Insulin Degludec (TRESIBA SC) Patient was instructed by Physician and understands   Linagliptin (TRADJENTA) 5 MG TABS Patient was instructed by Physician and understands   metoprolol tartrate (LOPRESSOR) 25 mg tablet Instructed patient per Anesthesia Guidelines   nitroglycerin (NITROSTAT) 0 4 mg SL tablet Patient was instructed by Physician and understands   pantoprazole (PROTONIX) 40 mg tablet Patient was instructed by Physician and understands   ranolazine (RANEXA) 500 mg 12 hr tablet Instructed patient per Anesthesia Guidelines   repaglinide (PRANDIN) 1 mg tablet Patient was instructed by Physician and understands  Pt to takemetoprolol and ranexa a m of procedure  LD plavix 07/22/21 per MD instruction to hold 5 days out

## 2021-11-06 ENCOUNTER — IMMUNIZATIONS (OUTPATIENT)
Dept: FAMILY MEDICINE CLINIC | Facility: HOSPITAL | Age: 76
End: 2021-11-06

## 2021-11-06 DIAGNOSIS — Z23 ENCOUNTER FOR IMMUNIZATION: Primary | ICD-10-CM

## 2021-11-06 PROCEDURE — 0001A COVID-19 PFIZER VACC 0.3 ML: CPT

## 2021-11-06 PROCEDURE — 91300 COVID-19 PFIZER VACC 0.3 ML: CPT

## 2021-12-29 ENCOUNTER — DOCUMENTATION (OUTPATIENT)
Dept: NEPHROLOGY | Facility: CLINIC | Age: 76
End: 2021-12-29

## 2021-12-29 ENCOUNTER — TELEPHONE (OUTPATIENT)
Dept: NEPHROLOGY | Facility: CLINIC | Age: 76
End: 2021-12-29

## 2021-12-29 LAB
EXT GLUCOSE BLD: 134
EXTERNAL ALBUMIN: 4.5
EXTERNAL ALK PHOS: 80
EXTERNAL ALT: 13
EXTERNAL AST: 16
EXTERNAL BICARBONATE: 29
EXTERNAL BUN: 23
EXTERNAL CALCIUM: 9.8
EXTERNAL CHLORIDE: 99
EXTERNAL CREATININE: 1.15
EXTERNAL EGFR: 47
EXTERNAL POTASSIUM: 5.4
EXTERNAL SODIUM: 135

## 2022-06-07 LAB
CREAT ?TM UR-SCNC: 73 UMOL/L
EXT MICROALBUMIN URINE RANDOM: 0.6
HBA1C MFR BLD HPLC: 7 %
MICROALBUMIN/CREAT UR: 8 MG/G{CREAT}

## 2022-06-08 ENCOUNTER — OFFICE VISIT (OUTPATIENT)
Dept: NEPHROLOGY | Facility: CLINIC | Age: 77
End: 2022-06-08
Payer: MEDICARE

## 2022-06-08 VITALS
HEIGHT: 65 IN | BODY MASS INDEX: 21.49 KG/M2 | WEIGHT: 129 LBS | HEART RATE: 78 BPM | DIASTOLIC BLOOD PRESSURE: 58 MMHG | SYSTOLIC BLOOD PRESSURE: 122 MMHG

## 2022-06-08 DIAGNOSIS — N28.1 RENAL CYST: ICD-10-CM

## 2022-06-08 DIAGNOSIS — E87.5 HYPERKALEMIA: Primary | ICD-10-CM

## 2022-06-08 DIAGNOSIS — N18.31 STAGE 3A CHRONIC KIDNEY DISEASE (HCC): ICD-10-CM

## 2022-06-08 PROCEDURE — 99213 OFFICE O/P EST LOW 20 MIN: CPT | Performed by: INTERNAL MEDICINE

## 2022-06-08 NOTE — PROGRESS NOTES
NEPHROLOGY OFFICE VISIT   Michale Hair 68 y o  female MRN: [id number]  6/8/2022    Reason for Visit: CKD II    ASSESSMENT and PLAN:    I had the pleasure of seeing Ms Doris Cruz today in the renal clinic for the continued management of CKD II      64 yo female with PMHx of DM (diagnosed 32 years ago), no retinopathy, HTN (diagnosed 32 years ago), HPL, CAD status post PCI in April 2019, patient appears to have had 4 cardiac catheterizations in 2019, ejection fraction improved to 60% in [facility] 5366, vasovagal syncope in May, former smoker quit 46 years ago (smoked for 10 years), who presents for follow up evaluation for proteinuria, and CKD       5/2021 - fall and had head injury and was in ER  Patient was last seen in the renal Clinic June 2018  Most recent lab work is in October 2021     1) 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 with recent SCOTT     Patient may have had acute kidney injury in the setting of four cardiac catheterizations over the 3 months prior to last appt in July 2019      -creatinine 0 8-1 mg/dL in April 2019  -creatinine on 5/9/2019 was 1 27 per dL  -repeat creatinine on 05/23 was 1 09 mg/dL  - creatinine January 2021 was stable at 1 08 mg/dL  -creatinine 1 15 in October 2021 with borderline elevated potassium at 5 4   - creat 1 09 mg/dL  -  Most recent U PCR is 0 118 in May 2021  - urinalysis glucosuria May 2021  - A1c 7 2% in march 2019  - renal u/s from 6/5/19 - right kidney 9 3 cm, left kidney 9 7 cm, bilateral kidneys are lobulated diffusely, right kidney possible 3 mm calculus, left kidney simple cyst   -repeat renal ultrasound in June 2020  -SPEP-hypogammaglobulinemia  -UPEP-no monoclonal band (but states glomerular pattern, though mainly albumin)  - C3, C4 nl  - kappa/lamda ratio  Slightly rising   1 8 in October 2020   Repeat is slightly higher at 2 3  but repeat 5/11/2021 is nl 1 47  -  SPEP repeated in January 2021 with selective protein loss pattern suggestive of nephrotic syndrome      overall, patient does not have nephrotic syndrome, has normal albumin, has nonnephrotic range proteinuria   Has slightly rising kappa/lambda ratio which is improved  -check renal ultrasound now  Patient states that she attempted to see the urologist but the urologist needed to see the films  Patient attempted to have [address] [address] records and films but was not sent over per the patient  Therefore patient has not seen the urologist as of yet  -I advised the patient to have ultrasound this month follow-up last year  - K 5 1  Advised patient low-potassium diet  - lower nut intake and decrease avocado intake   -patient had blood work from yesterday which she brought in  Creatinine is at baseline  Potassium was borderline elevated at 5 1  But is eating a high potassium diet   -no changes to medications today  -BMP in 1 month then in 6 months     2) Hyperkalemia - given chronic issue, it is likely due to impaired urinary potassium excretion      -potassium 5 1 on June 7th that was eating higher potassium diet  - pt appears to have had hyperkalemia since 2015  -concern for RTA  -may consider low-dose thiazide diuretic if needed  - improved after spinach removed from salads (now eating more dianne lettuce   -patient was eating avocado daily currently and eating nuts  Advised to lower intake  Patient agreeable     3) Anemia-f/u Hb per Primary Team     4) HTN - controlled     -monitor with metoprolol for now     5) acid/base     - recheck bicarb stable     6) microalbuminuria     -UPCR 118 on prior check  Microalbumin to creatinine ratio June 7th is normal     7) elevated phos     -PTH 79  - monitor for now     It was a pleasure evaluating your patient in the office today  Thank you for allowing our team to participate in the care Bailey Ashraf   Please do not hesitate to contact our team if further issues/questions shall arise in the interim      No problem-specific Assessment & Plan notes found for this encounter  HPI:    Patient denies complaints with the exception of knee pain requiring knee surgery soon  PATIENT INSTRUCTIONS:    Patient Instructions   1) Avoid NSAIDS - (Example - motrin, advil, ibuprofen, aleve, exederin, etc)  2) Always follow a low salt diet  3) please continue to follow a low potassium diet  4) labwork with BMP lab in one month  5) labwork then in 6 months  6) please have kidney ultrasound this month  7) please call Dr Debora Paige office to schedule Urology appointment  Tell them that you are having repeat ultrasound this month for yearly follow up from last ultrasound and the ultrasound had a 2 4 cm cyst in R kidney that looked possibly complicated  OBJECTIVE:  Current Weight: Weight - Scale: 58 5 kg (129 lb)  Vitals:    06/08/22 1304   BP: 122/58   BP Location: Right arm   Patient Position: Sitting   Cuff Size: Standard   Pulse: 78   Weight: 58 5 kg (129 lb)   Height: 5' 5" (1 651 m)    Body mass index is 21 47 kg/m²  REVIEW OF SYSTEMS:    Review of Systems   Constitutional: Negative  Negative for fatigue  HENT: Negative  Eyes: Negative  Respiratory: Negative  Negative for shortness of breath  Cardiovascular: Negative  Negative for leg swelling  Gastrointestinal: Negative  Endocrine: Negative  Genitourinary: Negative  Negative for difficulty urinating  Musculoskeletal: Negative  Skin: Negative  Allergic/Immunologic: Negative  Neurological: Negative  Hematological: Negative  Psychiatric/Behavioral: Negative  All other systems reviewed and are negative  PHYSICAL EXAM:      Physical Exam  Vitals and nursing note reviewed  Constitutional:       General: She is not in acute distress  Appearance: She is well-developed  She is not diaphoretic  HENT:      Head: Normocephalic and atraumatic  Eyes:      General: No scleral icterus  Right eye: No discharge           Left eye: No discharge  Conjunctiva/sclera: Conjunctivae normal    Neck:      Vascular: No JVD  Cardiovascular:      Rate and Rhythm: Normal rate and regular rhythm  Heart sounds: No murmur heard  No friction rub  No gallop  Pulmonary:      Effort: Pulmonary effort is normal  No respiratory distress  Breath sounds: Normal breath sounds  No wheezing or rales  Abdominal:      General: Bowel sounds are normal  There is no distension  Palpations: Abdomen is soft  Tenderness: There is no abdominal tenderness  There is no rebound  Musculoskeletal:         General: No tenderness or deformity  Normal range of motion  Cervical back: Normal range of motion and neck supple  Skin:     General: Skin is warm and dry  Coloration: Skin is not pale  Findings: No erythema or rash  Neurological:      Mental Status: She is alert and oriented to person, place, and time  Coordination: Coordination normal    Psychiatric:         Behavior: Behavior normal          Thought Content:  Thought content normal          Judgment: Judgment normal          Medications:    Current Outpatient Medications:     aspirin 81 mg chewable tablet, Chew, Disp: , Rfl:     atorvastatin (LIPITOR) 40 mg tablet, Take 40 mg by mouth daily, Disp: , Rfl:     bimatoprost (LUMIGAN) 0 01 % ophthalmic drops, Apply to eye, Disp: , Rfl:     Calcium Carbonate-Vit D-Min (CALCIUM 1200 PO), Take 1,200 mg by mouth, Disp: , Rfl:     Cholecalciferol (VITAMIN D) 50 MCG (2000 UT) tablet, Take 2,000 Units by mouth daily, Disp: , Rfl:     clopidogrel (PLAVIX) 75 mg tablet, Take 75 mg by mouth daily, Disp: , Rfl:     cycloSPORINE (RESTASIS) 0 05 % ophthalmic emulsion, Administer 1 drop to both eyes 2 (two) times a day, Disp: , Rfl:     Empagliflozin 10 MG TABS, Take 10 mg by mouth every morning, Disp: , Rfl:     glimepiride (AMARYL) 4 mg tablet, Take 1 mg by mouth every morning before breakfast , Disp: , Rfl:     Insulin Degludec (TRESIBA SC), Inject 10 Units under the skin every morning, Disp: , Rfl:     linaGLIPtin 5 MG TABS, Take by mouth, Disp: , Rfl:     metoprolol tartrate (LOPRESSOR) 25 mg tablet, Take 25 mg by mouth daily at bedtime , Disp: , Rfl:     nitroglycerin (NITROSTAT) 0 4 mg SL tablet, Place 0 4 mg under the tongue every 5 (five) minutes as needed for chest pain, Disp: , Rfl:     pantoprazole (PROTONIX) 40 mg tablet, Take 40 mg by mouth daily, Disp: , Rfl:     ranolazine (RANEXA) 500 mg 12 hr tablet, Take 1,000 mg by mouth 2 (two) times a day , Disp: , Rfl:     repaglinide (PRANDIN) 1 mg tablet, Take 1 mg by mouth 3 (three) times a day before meals PRN, Disp: , Rfl:     Laboratory Results:        Invalid input(s): ALBUMIN    Results for orders placed or performed in visit on 12/29/21   Comprehensive metabolic panel   Result Value Ref Range    SODIUM 135     POTASSIUM 5 4     CHLORIDE 99     BICARB 29     BUN 23     CREATININE 1 15     Glucose 134     EXTERNAL CALCIUM 9 8     ALBUMIN 4 5     AST 16     ALT 13     ALK PHOS 80     EXTERNAL EGFR 47

## 2022-06-08 NOTE — LETTER
June 8, 2022     Eliz Salinas, [address]  [address] [facility]    Patient: Divya Schmitz   YOB: 1945   Date of Visit: 6/8/2022       Dear Dr Sima Werner: Thank you for referring Chapin Woodard to me for evaluation  Below are my notes for this consultation  If you have questions, please do not hesitate to call me  I look forward to following your patient along with you  Sincerely,        Shalini Sousa MD        CC: No Recipients  Shalini Sousa MD  6/8/2022  1:38 PM  Sign when Signing Visit  NEPHROLOGY OFFICE VISIT   Divya Para 68 y o  female MRN: [id number]  6/8/2022    Reason for Visit: CKD II    ASSESSMENT and PLAN:    I had the pleasure of seeing Ms Keren Amor today in the renal clinic for the continued management of CKD II      64 yo female with PMHx of DM (diagnosed 32 years ago), no retinopathy, HTN (diagnosed 32 years ago), HPL, CAD status post PCI in April 2019, patient appears to have had 4 cardiac catheterizations in 2019, ejection fraction improved to 60% in [place] 4356, vasovagal syncope in May, former smoker quit 46 years ago (smoked for 10 years), who presents for follow up evaluation for proteinuria, and CKD       5/2021 - fall and had head injury and was in ER  Patient was last seen in the renal Clinic June 2018  Most recent lab work is in October 2021     1) 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 with recent SCOTT     Patient may have had acute kidney injury in the setting of four cardiac catheterizations over the 3 months prior to last appt in July 2019      -creatinine 0 8-1 mg/dL in April 2019  -creatinine on 5/9/2019 was 1 27 per dL  -repeat creatinine on 05/23 was 1 09 mg/dL  - creatinine January 2021 was stable at 1 08 mg/dL  -creatinine 1 15 in October 2021 with borderline elevated potassium at 5 4   - creat 1 09 mg/dL     -  Most recent U PCR is 0 118 in May 2021  - urinalysis glucosuria May 2021  - A1c 7 2% in march 2019  - renal u/s from 6/5/19 - right kidney 9 3 cm, left kidney 9 7 cm, bilateral kidneys are lobulated diffusely, right kidney possible 3 mm calculus, left kidney simple cyst   -repeat renal ultrasound in June 2020  -SPEP-hypogammaglobulinemia  -UPEP-no monoclonal band (but states glomerular pattern, though mainly albumin)  - C3, C4 nl  - kappa/lamda ratio  Slightly rising   1 8 in October 2020  Repeat is slightly higher at 2 3  but repeat 5/11/2021 is nl 1 47  -  SPEP repeated in January 2021 with selective protein loss pattern suggestive of nephrotic syndrome      overall, patient does not have nephrotic syndrome, has normal albumin, has nonnephrotic range proteinuria   Has slightly rising kappa/lambda ratio which is improved  -check renal ultrasound now  Patient states that she attempted to see the urologist but the urologist needed to see the films  Patient attempted to have [address] records and films but was not sent over per the patient  Therefore patient has not seen the urologist as of yet  -I advised the patient to have ultrasound this month follow-up last year  - K 5 1  Advised patient low-potassium diet  - lower nut intake and decrease avocado intake   -patient had blood work from yesterday which she brought in  Creatinine is at baseline  Potassium was borderline elevated at 5 1  But is eating a high potassium diet   -no changes to medications today  -BMP in 1 month then in 6 months     2) Hyperkalemia - given chronic issue, it is likely due to impaired urinary potassium excretion      -potassium 5 1 on June 7th that was eating higher potassium diet  - pt appears to have had hyperkalemia since 2015  -concern for RTA  -may consider low-dose thiazide diuretic if needed  - improved after spinach removed from salads (now eating more dianne lettuce   -patient was eating avocado daily currently and eating nuts  Advised to lower intake  Patient agreeable       3) Anemia-f/u Hb per Primary Team     4) HTN - controlled     -monitor with metoprolol for now     5) acid/base     - recheck bicarb stable     6) microalbuminuria     -UPCR 118 on prior check  Microalbumin to creatinine ratio June 7th is normal     7) elevated phos     -PTH 79  - monitor for now     It was a pleasure evaluating your patient in the office today  Thank you for allowing our team to participate in the care Mary Powers  Please do not hesitate to contact our team if further issues/questions shall arise in the interim      No problem-specific Assessment & Plan notes found for this encounter  HPI:    Patient denies complaints with the exception of knee pain requiring knee surgery soon  PATIENT INSTRUCTIONS:    Patient Instructions   1) Avoid NSAIDS - (Example - motrin, advil, ibuprofen, aleve, exederin, etc)  2) Always follow a low salt diet  3) please continue to follow a low potassium diet  4) labwork with BMP lab in one month  5) labwork then in 6 months  6) please have kidney ultrasound this month  7) please call Dr Schneider Friend office to schedule Urology appointment  Tell them that you are having repeat ultrasound this month for yearly follow up from last ultrasound and the ultrasound had a 2 4 cm cyst in R kidney that looked possibly complicated  OBJECTIVE:  Current Weight: Weight - Scale: 58 5 kg (129 lb)  Vitals:    06/08/22 1304   BP: 122/58   BP Location: Right arm   Patient Position: Sitting   Cuff Size: Standard   Pulse: 78   Weight: 58 5 kg (129 lb)   Height: 5' 5" (1 651 m)    Body mass index is 21 47 kg/m²  REVIEW OF SYSTEMS:    Review of Systems   Constitutional: Negative  Negative for fatigue  HENT: Negative  Eyes: Negative  Respiratory: Negative  Negative for shortness of breath  Cardiovascular: Negative  Negative for leg swelling  Gastrointestinal: Negative  Endocrine: Negative  Genitourinary: Negative  Negative for difficulty urinating     Musculoskeletal: Negative  Skin: Negative  Allergic/Immunologic: Negative  Neurological: Negative  Hematological: Negative  Psychiatric/Behavioral: Negative  All other systems reviewed and are negative  PHYSICAL EXAM:      Physical Exam  Vitals and nursing note reviewed  Constitutional:       General: She is not in acute distress  Appearance: She is well-developed  She is not diaphoretic  HENT:      Head: Normocephalic and atraumatic  Eyes:      General: No scleral icterus  Right eye: No discharge  Left eye: No discharge  Conjunctiva/sclera: Conjunctivae normal    Neck:      Vascular: No JVD  Cardiovascular:      Rate and Rhythm: Normal rate and regular rhythm  Heart sounds: No murmur heard  No friction rub  No gallop  Pulmonary:      Effort: Pulmonary effort is normal  No respiratory distress  Breath sounds: Normal breath sounds  No wheezing or rales  Abdominal:      General: Bowel sounds are normal  There is no distension  Palpations: Abdomen is soft  Tenderness: There is no abdominal tenderness  There is no rebound  Musculoskeletal:         General: No tenderness or deformity  Normal range of motion  Cervical back: Normal range of motion and neck supple  Skin:     General: Skin is warm and dry  Coloration: Skin is not pale  Findings: No erythema or rash  Neurological:      Mental Status: She is alert and oriented to person, place, and time  Coordination: Coordination normal    Psychiatric:         Behavior: Behavior normal          Thought Content:  Thought content normal          Judgment: Judgment normal          Medications:    Current Outpatient Medications:     aspirin 81 mg chewable tablet, Chew, Disp: , Rfl:     atorvastatin (LIPITOR) 40 mg tablet, Take 40 mg by mouth daily, Disp: , Rfl:     bimatoprost (LUMIGAN) 0 01 % ophthalmic drops, Apply to eye, Disp: , Rfl:     Calcium Carbonate-Vit D-Min (CALCIUM 1200 PO), Take 1,200 mg by mouth, Disp: , Rfl:     Cholecalciferol (VITAMIN D) 50 MCG (2000 UT) tablet, Take 2,000 Units by mouth daily, Disp: , Rfl:     clopidogrel (PLAVIX) 75 mg tablet, Take 75 mg by mouth daily, Disp: , Rfl:     cycloSPORINE (RESTASIS) 0 05 % ophthalmic emulsion, Administer 1 drop to both eyes 2 (two) times a day, Disp: , Rfl:     Empagliflozin 10 MG TABS, Take 10 mg by mouth every morning, Disp: , Rfl:     glimepiride (AMARYL) 4 mg tablet, Take 1 mg by mouth every morning before breakfast , Disp: , Rfl:     Insulin Degludec (TRESIBA SC), Inject 10 Units under the skin every morning, Disp: , Rfl:     linaGLIPtin 5 MG TABS, Take by mouth, Disp: , Rfl:     metoprolol tartrate (LOPRESSOR) 25 mg tablet, Take 25 mg by mouth daily at bedtime , Disp: , Rfl:     nitroglycerin (NITROSTAT) 0 4 mg SL tablet, Place 0 4 mg under the tongue every 5 (five) minutes as needed for chest pain, Disp: , Rfl:     pantoprazole (PROTONIX) 40 mg tablet, Take 40 mg by mouth daily, Disp: , Rfl:     ranolazine (RANEXA) 500 mg 12 hr tablet, Take 1,000 mg by mouth 2 (two) times a day , Disp: , Rfl:     repaglinide (PRANDIN) 1 mg tablet, Take 1 mg by mouth 3 (three) times a day before meals PRN, Disp: , Rfl:     Laboratory Results:        Invalid input(s): ALBUMIN    Results for orders placed or performed in visit on 12/29/21   Comprehensive metabolic panel   Result Value Ref Range    SODIUM 135     POTASSIUM 5 4     CHLORIDE 99     BICARB 29     BUN 23     CREATININE 1 15     Glucose 134     EXTERNAL CALCIUM 9 8     ALBUMIN 4 5     AST 16     ALT 13     ALK PHOS 80     EXTERNAL EGFR 47

## 2022-06-08 NOTE — PATIENT INSTRUCTIONS
1) Avoid NSAIDS - (Example - motrin, advil, ibuprofen, aleve, exederin, etc)  2) Always follow a low salt diet  3) please continue to follow a low potassium diet  4) labwork with BMP lab in one month  5) labwork then in 6 months  6) please have kidney ultrasound this month  7) please call Dr Bambi Canchola office to schedule Urology appointment  Tell them that you are having repeat ultrasound this month for yearly follow up from last ultrasound and the ultrasound had a 2 4 cm cyst in R kidney that looked possibly complicated

## 2022-11-19 LAB
BACTERIA UR QL AUTO: NORMAL /HPF
BUN SERPL-MCNC: 30 MG/DL (ref 7–25)
BUN/CREAT SERPL: 24 (CALC) (ref 6–22)
CALCIUM SERPL-MCNC: 10.1 MG/DL (ref 8.6–10.4)
CALCIUM SERPL-MCNC: 10.1 MG/DL (ref 8.6–10.4)
CHLORIDE SERPL-SCNC: 101 MMOL/L (ref 98–110)
CO2 SERPL-SCNC: 30 MMOL/L (ref 20–32)
CREAT SERPL-MCNC: 1.24 MG/DL (ref 0.6–1)
CREAT UR-MCNC: 74 MG/DL (ref 20–275)
ERYTHROCYTE [DISTWIDTH] IN BLOOD BY AUTOMATED COUNT: 14.1 % (ref 11–15)
GFR/BSA.PRED SERPLBLD CYS-BASED-ARV: 45 ML/MIN/1.73M2
GLUCOSE SERPL-MCNC: 131 MG/DL (ref 65–99)
HCT VFR BLD AUTO: 38 % (ref 35–45)
HGB BLD-MCNC: 12.7 G/DL (ref 11.7–15.5)
HYALINE CASTS #/AREA URNS LPF: NORMAL /LPF
MAGNESIUM SERPL-MCNC: 2.3 MG/DL (ref 1.5–2.5)
MCH RBC QN AUTO: 30.2 PG (ref 27–33)
MCHC RBC AUTO-ENTMCNC: 33.4 G/DL (ref 32–36)
MCV RBC AUTO: 90.3 FL (ref 80–100)
PHOSPHATE SERPL-MCNC: 4.5 MG/DL (ref 2.1–4.3)
PLATELET # BLD AUTO: 329 THOUSAND/UL (ref 140–400)
PMV BLD REES-ECKER: 9.2 FL (ref 7.5–12.5)
POTASSIUM SERPL-SCNC: 5.4 MMOL/L (ref 3.5–5.3)
PROT UR-MCNC: 13 MG/DL (ref 5–24)
PROT/CREAT UR: 0.18 MG/MG CREAT (ref 0.02–0.18)
PROT/CREAT UR: 176 MG/G CREAT (ref 24–184)
PTH-INTACT SERPL-MCNC: 90 PG/ML (ref 16–77)
RBC # BLD AUTO: 4.21 MILLION/UL (ref 3.8–5.1)
RBC #/AREA URNS HPF: NORMAL /HPF
SODIUM SERPL-SCNC: 139 MMOL/L (ref 135–146)
SQUAMOUS #/AREA URNS HPF: NORMAL /HPF
WBC # BLD AUTO: 8.4 THOUSAND/UL (ref 3.8–10.8)
WBC #/AREA URNS HPF: NORMAL /HPF

## 2022-11-21 ENCOUNTER — TELEPHONE (OUTPATIENT)
Dept: NEPHROLOGY | Facility: CLINIC | Age: 77
End: 2022-11-21

## 2022-11-21 NOTE — RESULT ENCOUNTER NOTE
Hello    Patient normally is followed up by Ms Carlos A Mckenna  Please let pt know creat upper limit of baseline 1 2  but K 5 4 still  (Unchanged)  - is pt eating high K foods? If yes, please ask pt to limit  - if no, then please encourage to cont to follow low K diet  - I am seeing pt soon, so for now, Low K diet   Will review at appt next week regarding plans    Thank you    np

## 2022-11-21 NOTE — TELEPHONE ENCOUNTER
Pt advised that CR at 1 2, K 5 4  Pt states she had knee replacement in July and has not been cooking for herself, so her diet has been "off"  Encouraged to try and follow low K diet per Dr Kerri Godoy     ----- Message from Hannah Contreras MD sent at 11/20/2022 10:28 PM EST -----  Hello    Patient normally is followed up by Ms Bianca Hernandez  Please let pt know creat upper limit of baseline 1 2  but K 5 4 still  (Unchanged)  - is pt eating high K foods? If yes, please ask pt to limit  - if no, then please encourage to cont to follow low K diet  - I am seeing pt soon, so for now, Low K diet   Will review at appt next week regarding plans    Thank you    np

## 2022-12-02 ENCOUNTER — TELEPHONE (OUTPATIENT)
Dept: NEPHROLOGY | Facility: CLINIC | Age: 77
End: 2022-12-02

## 2022-12-02 ENCOUNTER — OFFICE VISIT (OUTPATIENT)
Dept: NEPHROLOGY | Facility: CLINIC | Age: 77
End: 2022-12-02

## 2022-12-02 VITALS
BODY MASS INDEX: 22.33 KG/M2 | HEART RATE: 82 BPM | SYSTOLIC BLOOD PRESSURE: 118 MMHG | WEIGHT: 134 LBS | HEIGHT: 65 IN | DIASTOLIC BLOOD PRESSURE: 60 MMHG

## 2022-12-02 DIAGNOSIS — E83.39 HYPERPHOSPHATEMIA: ICD-10-CM

## 2022-12-02 DIAGNOSIS — N18.2 CKD (CHRONIC KIDNEY DISEASE), STAGE II: Primary | ICD-10-CM

## 2022-12-02 DIAGNOSIS — E34.9 INCREASED PTH LEVEL: ICD-10-CM

## 2022-12-02 DIAGNOSIS — E87.5 HYPERKALEMIA: ICD-10-CM

## 2022-12-02 DIAGNOSIS — E11.42 PERIPHERAL SENSORY NEUROPATHY DUE TO TYPE 2 DIABETES MELLITUS (HCC): ICD-10-CM

## 2022-12-02 RX ORDER — MECLIZINE HYDROCHLORIDE 25 MG/1
25 TABLET ORAL AS NEEDED
COMMUNITY
Start: 2022-06-16

## 2022-12-02 NOTE — LETTER
December 2, 2022     Shayy Rosales, [address]  [address] [facility]    Patient: Mine Lopez   YOB: 1945   Date of Visit: 12/2/2022       Dear Dr Shi De Souaz: Thank you for referring Bernice Gray to me for evaluation  Below are my notes for this consultation  If you have questions, please do not hesitate to call me  I look forward to following your patient along with you  Sincerely,        Nani Bradshaw MD        CC: No Recipients  Nani Bradshaw MD  12/2/2022  1:33 PM  Sign when Signing Visit  NEPHROLOGY OFFICE VISIT   Mine Lopez 68 y o  female MRN: [id number]  12/2/2022    Reason for Visit:  CKD follow-up    ASSESSMENT and PLAN:    I had the pleasure of seeing Ms Nehemias Shen today in the renal clinic for the continued management of CKD f/u     64 yo female with PMHx of DM (diagnosed 32 years ago), no retinopathy, HTN (diagnosed 32 years ago), HPL, CAD status post PCI in April 2019, patient appears to have had 4 cardiac catheterizations in 2019, ejection fraction improved to 60% in [place] 3928, vasovagal syncope in May, former smoker quit 46 years ago (smoked for 10 years), who presents for follow up evaluation for proteinuria, and CKD       5/2021 - fall and had head injury and was in ER       Patient was last seen in the renal Clinic June 2018  Most recent lab work is in October 2021     1) 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 with SCOTT prior     Patient may have had acute kidney injury in the setting of four cardiac catheterizations over the 3 months prior to last appt in July 2019      -creatinine 0 8-1 mg/dL in April 2019  -creatinine on 5/9/2019 was 1 27 per dL  -repeat creatinine on 05/23 was 1 09 mg/dL  - creatinine January 2021 was stable at 1 08 mg/dL    -creatinine 1 15 in October 2021 with borderline elevated potassium at 5 4   - A1c 7 % in 6/2022  - renal u/s from 6/5/19 - right kidney 9 3 cm, left kidney 9 7 cm, bilateral kidneys are lobulated diffusely, right kidney possible 3 mm calculus, left kidney simple cyst   -repeat renal ultrasound in June 2020  -SPEP-hypogammaglobulinemia  -UPEP-no monoclonal band (but states glomerular pattern, though mainly albumin)  - C3, C4 nl  - kappa/lamda ratio  Slightly rising   1 8 in October 2020  Repeat is slightly higher at 2  3  but repeat 5/11/2021 is nl 1 47  -  SPEP repeated in January 2021 with selective protein loss pattern suggestive of nephrotic syndrome      overall, patient does not have nephrotic syndrome, has normal albumin, has nonnephrotic range proteinuria   Has slightly rising kappa/lambda ratio which is improved   (absolute values at that time were not elevated0    Plan   -patient was supposed to complete ultrasound to follow-up complicated cyst from prior  Was advised to complete at past appointment  Not yet completed this appointment  Patient agreeable to complete next week [facility]   -recheck lab work in 1 month  -check vitamin-D level   -check repeat PTH and phosphorus   - is going to [facility] for 6 months    2) Hyperkalemia - given chronic issue, it is likely due to impaired urinary potassium excretion  DM  RTA?     -potassium 5 1 on June 7th that was eating higher potassium diet  - pt appears to have had hyperkalemia since 2015  -concern for RTA  -may consider low-dose thiazide diuretic if needed  - improved after spinach removed from salads --> but has been rising again possibly in the setting of dietary indiscretion     3) Anemia-f/u Hb per Primary Team     4) HTN - controlled     -monitor with metoprolol for now     5) acid/base     - recheck bicarb stable     6) microalbuminuria     -UPCR 0 176 milligram/gram 11/18/2022  Monitoring for now    - cont jardiance     7) elevated phos     -PTH 79 --> 90 11/2022  - monitor for now  -and check levels as noted above  - takes vit D 1000 once daily    8) DM    - on jardiance, amaryl, linagliptin, repaglinide     It was a pleasure evaluating your patient in the office today  Thank you for allowing our team to participate in the care Bailey Ashraf  Please do not hesitate to contact our team if further issues/questions shall arise in the interim         No problem-specific Assessment & Plan notes found for this encounter  HPI:    No fevers, chills, nausea, vomiting  Knee stiff  PATIENT INSTRUCTIONS:    Patient Instructions   1) Avoid NSAIDS - (Example - motrin, advil, ibuprofen, aleve, exederin, etc)  2) Always follow a low salt diet  3) follow low potassium diet  4) labwork in one month  5) no changes for now          OBJECTIVE:  Current Weight: Weight - Scale: 60 8 kg (134 lb)  Vitals:    12/02/22 1249   BP: 118/60   BP Location: Left arm   Patient Position: Sitting   Cuff Size: Standard   Pulse: 82   Weight: 60 8 kg (134 lb)   Height: 5' 5" (1 651 m)    Body mass index is 22 3 kg/m²  REVIEW OF SYSTEMS:    Review of Systems   Constitutional: Negative  Negative for fatigue  Has gained some weight since knee surgery   HENT: Negative  Eyes: Negative  Respiratory: Negative  Negative for shortness of breath  Cardiovascular: Negative  Negative for leg swelling  Gastrointestinal: Negative  Endocrine: Negative  Genitourinary: Negative  Negative for difficulty urinating  Musculoskeletal:        Knee stiffness   Skin: Negative  Allergic/Immunologic: Negative  Neurological: Negative  Hematological: Negative  Psychiatric/Behavioral: Negative  All other systems reviewed and are negative  PHYSICAL EXAM:      Physical Exam  Vitals and nursing note reviewed  Constitutional:       General: She is not in acute distress  Appearance: She is well-developed and well-nourished  She is not diaphoretic  HENT:      Head: Normocephalic and atraumatic  Eyes:      General: No scleral icterus  Right eye: No discharge  Left eye: No discharge  Conjunctiva/sclera: Conjunctivae normal    Neck:      Vascular: No JVD  Cardiovascular:      Rate and Rhythm: Normal rate and regular rhythm  Heart sounds: No murmur heard  No friction rub  No gallop  Pulmonary:      Effort: Pulmonary effort is normal  No respiratory distress  Breath sounds: Normal breath sounds  No wheezing or rales  Abdominal:      General: Bowel sounds are normal  There is no distension  Palpations: Abdomen is soft  Tenderness: There is no abdominal tenderness  There is no rebound  Musculoskeletal:         General: No tenderness, deformity or edema  Normal range of motion  Cervical back: Normal range of motion and neck supple  Skin:     General: Skin is warm and dry  Coloration: Skin is not pale  Findings: No erythema or rash  Neurological:      Mental Status: She is alert and oriented to person, place, and time  Coordination: Coordination normal    Psychiatric:         Mood and Affect: Mood and affect normal          Behavior: Behavior normal          Thought Content:  Thought content normal          Judgment: Judgment normal          Medications:    Current Outpatient Medications:   •  aspirin 81 mg chewable tablet, Chew, Disp: , Rfl:   •  atorvastatin (LIPITOR) 40 mg tablet, Take 40 mg by mouth daily, Disp: , Rfl:   •  bimatoprost (LUMIGAN) 0 01 % ophthalmic drops, Apply to eye, Disp: , Rfl:   •  Calcium Carbonate-Vit D-Min (CALCIUM 1200 PO), Take 1,200 mg by mouth, Disp: , Rfl:   •  Cholecalciferol (VITAMIN D) 50 MCG (2000 UT) tablet, Take 2,000 Units by mouth daily, Disp: , Rfl:   •  clopidogrel (PLAVIX) 75 mg tablet, Take 75 mg by mouth daily, Disp: , Rfl:   •  cycloSPORINE (RESTASIS) 0 05 % ophthalmic emulsion, Administer 1 drop to both eyes 2 (two) times a day, Disp: , Rfl:   •  Empagliflozin 10 MG TABS, Take 10 mg by mouth every morning, Disp: , Rfl:   •  glimepiride (AMARYL) 4 mg tablet, Take 1 mg by mouth every morning before breakfast , Disp: , Rfl:   •  Insulin Degludec (TRESIBA SC), Inject 10 Units under the skin every morning, Disp: , Rfl:   •  linaGLIPtin 5 MG TABS, Take by mouth, Disp: , Rfl:   •  meclizine (ANTIVERT) 25 mg tablet, Take 25 mg by mouth as needed, Disp: , Rfl:   •  metoprolol tartrate (LOPRESSOR) 25 mg tablet, Take 25 mg by mouth daily at bedtime , Disp: , Rfl:   •  nitroglycerin (NITROSTAT) 0 4 mg SL tablet, Place 0 4 mg under the tongue every 5 (five) minutes as needed for chest pain, Disp: , Rfl:   •  pantoprazole (PROTONIX) 40 mg tablet, Take 40 mg by mouth daily, Disp: , Rfl:   •  ranolazine (RANEXA) 500 mg 12 hr tablet, Take 1,000 mg by mouth 2 (two) times a day , Disp: , Rfl:   •  repaglinide (PRANDIN) 1 mg tablet, Take 1 mg by mouth 3 (three) times a day before meals PRN, Disp: , Rfl:     Laboratory Results:        Invalid input(s): ALBUMIN    Results for orders placed or performed in visit on 11/18/22   PTH, Intact and Calcium   Result Value Ref Range    PTH, Intact 90 (H) 16 - 77 pg/mL    Calcium 10 1 8 6 - 10 4 mg/dL   Magnesium   Result Value Ref Range    Magnesium, Serum 2 3 1 5 - 2 5 mg/dL   Phosphorus   Result Value Ref Range    Phosphorus, Serum 4 5 (H) 2 1 - 4 3 mg/dL   Basic metabolic panel   Result Value Ref Range    Glucose, Random 131 (H) 65 - 99 mg/dL    BUN 30 (H) 7 - 25 mg/dL    Creatinine 1 24 (H) 0 60 - 1 00 mg/dL    eGFR 45 (L) > OR = 60 mL/min/1 73m2    SL AMB BUN/CREATININE RATIO 24 (H) 6 - 22 (calc)    Sodium 139 135 - 146 mmol/L    Potassium 5 4 (H) 3 5 - 5 3 mmol/L    Chloride 101 98 - 110 mmol/L    CO2 30 20 - 32 mmol/L    Calcium 10 1 8 6 - 10 4 mg/dL   CBC   Result Value Ref Range    White Blood Cell Count 8 4 3 8 - 10 8 Thousand/uL    Red Blood Cell Count 4 21 3 80 - 5 10 Million/uL    Hemoglobin 12 7 11 7 - 15 5 g/dL    HCT 38 0 35 0 - 45 0 %    MCV 90 3 80 0 - 100 0 fL    MCH 30 2 27 0 - 33 0 pg    MCHC 33 4 32 0 - 36 0 g/dL    RDW 14 1 11 0 - 15 0 %    Platelet Count 768 941 - 400 Thousand/uL    SL AMB MPV 9 2 7 5 - 12 5 fL   Urine Microscopic   Result Value Ref Range    SL AMB WBC, URINE NONE SEEN < OR = 5 /HPF    RBC, Urine NONE SEEN < OR = 2 /HPF    Squamous Epithelial Cells 0-5 < OR = 5 /HPF    Bacteria, UA NONE SEEN NONE SEEN /HPF    Hyaline Casts NONE SEEN NONE SEEN /LPF    Comment(s)     Protein, Total w/Creat, Random Urine   Result Value Ref Range    Creatinine, Urine 74 20 - 275 mg/dL    Protein/Creat Ratio 176 24 - 184 mg/g creat    Protein/Creat Ratio 0 176 0 024 - 0 184 mg/mg creat    Total Protein, Urine 13 5 - 24 mg/dL

## 2022-12-02 NOTE — TELEPHONE ENCOUNTER
AMAN for pt informing her US is scheduled for 12/8 @ 1:00 in [facility]

## 2022-12-02 NOTE — PATIENT INSTRUCTIONS
1) Avoid NSAIDS - (Example - motrin, advil, ibuprofen, aleve, exederin, etc)  2) Always follow a low salt diet  3) follow low potassium diet  4) labwork in one month  5) no changes for now

## 2022-12-02 NOTE — PROGRESS NOTES
NEPHROLOGY OFFICE VISIT   Mine Lopez 68 y o  female MRN: [id number]  12/2/2022    Reason for Visit:  CKD follow-up    ASSESSMENT and PLAN:    I had the pleasure of seeing Ms Nehemias Shen today in the renal clinic for the continued management of CKD f/u     64 yo female with PMHx of DM (diagnosed 32 years ago), no retinopathy, HTN (diagnosed 32 years ago), HPL, CAD status post PCI in April 2019, patient appears to have had 4 cardiac catheterizations in 2019, ejection fraction improved to 60% in NXD 9945, vasovagal syncope in May, former smoker quit 46 years ago (smoked for 10 years), who presents for follow up evaluation for proteinuria, and CKD       5/2021 - fall and had head injury and was in ER       Patient was last seen in the renal Clinic June 2018  Most recent lab work is in October 2021     1) 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 with [facility]OTT prior     Patient may have had acute kidney injury in the setting of four cardiac catheterizations over the 3 months prior to last appt in July 2019      -creatinine 0 8-1 mg/dL in April 2019  -creatinine on 5/9/2019 was 1 27 per dL  -repeat creatinine on 05/23 was 1 09 mg/dL  - creatinine January 2021 was stable at 1 08 mg/dL  -creatinine 1 15 in October 2021 with borderline elevated potassium at 5 4   - A1c 7 % in 6/2022  - renal u/s from 6/5/19 - right kidney 9 3 cm, left kidney 9 7 cm, bilateral kidneys are lobulated diffusely, right kidney possible 3 mm calculus, left kidney simple cyst   -repeat renal ultrasound in June 2020  -SPEP-hypogammaglobulinemia  -UPEP-no monoclonal band (but states glomerular pattern, though mainly albumin)  - C3, C4 nl  - kappa/lamda ratio  Slightly rising   1 8 in October 2020  Repeat is slightly higher at 2  3  but repeat 5/11/2021 is nl 1 47  -  SPEP repeated in January 2021 with selective protein loss pattern suggestive of nephrotic syndrome      overall, patient does not have nephrotic syndrome, has normal albumin, has nonnephrotic range proteinuria   Has slightly rising kappa/lambda ratio which is improved   (absolute values at that time were not elevated0    Plan   -patient was supposed to complete ultrasound to follow-up complicated cyst from prior  Was advised to complete at past appointment  Not yet completed this appointment  Patient agreeable to complete next week before going to [facility]   -recheck lab work in 1 month  -check vitamin-D level   -check repeat PTH and phosphorus   - is going to [facility] for 6 months    2) Hyperkalemia - given chronic issue, it is likely due to impaired urinary potassium excretion  DM  RTA?     -potassium 5 1 on June 7th that was eating higher potassium diet  - pt appears to have had hyperkalemia since 2015  -concern for RTA  -may consider low-dose thiazide diuretic if needed  - improved after spinach removed from salads --> but has been rising again possibly in the setting of dietary indiscretion     3) Anemia-f/u Hb per Primary Team     4) HTN - controlled     -monitor with metoprolol for now     5) acid/base     - recheck bicarb stable     6) microalbuminuria     -UPCR 0 176 milligram/gram 11/18/2022  Monitoring for now  - cont jardiance     7) elevated phos     -PTH 79 --> 90 11/2022  - monitor for now  -and check levels as noted above  - takes vit D 1000 once daily    8) DM    - on jardiance, amaryl, linagliptin, repaglinide     It was a pleasure evaluating your patient in the office today  Thank you for allowing our team to participate in the care Lucian Mai  Please do not hesitate to contact our team if further issues/questions shall arise in the interim         No problem-specific Assessment & Plan notes found for this encounter  HPI:    No fevers, chills, nausea, vomiting  Knee stiff       PATIENT INSTRUCTIONS:    Patient Instructions   1) Avoid NSAIDS - (Example - motrin, advil, ibuprofen, aleve, exederin, etc)  2) Always follow a low salt diet  3) follow low potassium diet  4) labwork in one month  5) no changes for now          OBJECTIVE:  Current Weight: Weight - Scale: 60 8 kg (134 lb)  Vitals:    12/02/22 1249   BP: 118/60   BP Location: Left arm   Patient Position: Sitting   Cuff Size: Standard   Pulse: 82   Weight: 60 8 kg (134 lb)   Height: 5' 5" (1 651 m)    Body mass index is 22 3 kg/m²  REVIEW OF SYSTEMS:    Review of Systems   Constitutional: Negative  Negative for fatigue  Has gained some weight since knee surgery   HENT: Negative  Eyes: Negative  Respiratory: Negative  Negative for shortness of breath  Cardiovascular: Negative  Negative for leg swelling  Gastrointestinal: Negative  Endocrine: Negative  Genitourinary: Negative  Negative for difficulty urinating  Musculoskeletal:        Knee stiffness   Skin: Negative  Allergic/Immunologic: Negative  Neurological: Negative  Hematological: Negative  Psychiatric/Behavioral: Negative  All other systems reviewed and are negative  PHYSICAL EXAM:      Physical Exam  Vitals and nursing note reviewed  Constitutional:       General: She is not in acute distress  Appearance: She is well-developed and well-nourished  She is not diaphoretic  HENT:      Head: Normocephalic and atraumatic  Eyes:      General: No scleral icterus  Right eye: No discharge  Left eye: No discharge  Conjunctiva/sclera: Conjunctivae normal    Neck:      Vascular: No JVD  Cardiovascular:      Rate and Rhythm: Normal rate and regular rhythm  Heart sounds: No murmur heard  No friction rub  No gallop  Pulmonary:      Effort: Pulmonary effort is normal  No respiratory distress  Breath sounds: Normal breath sounds  No wheezing or rales  Abdominal:      General: Bowel sounds are normal  There is no distension  Palpations: Abdomen is soft  Tenderness: There is no abdominal tenderness  There is no rebound     Musculoskeletal: General: No tenderness, deformity or edema  Normal range of motion  Cervical back: Normal range of motion and neck supple  Skin:     General: Skin is warm and dry  Coloration: Skin is not pale  Findings: No erythema or rash  Neurological:      Mental Status: She is alert and oriented to person, place, and time  Coordination: Coordination normal    Psychiatric:         Mood and Affect: Mood and affect normal          Behavior: Behavior normal          Thought Content:  Thought content normal          Judgment: Judgment normal          Medications:    Current Outpatient Medications:   •  aspirin 81 mg chewable tablet, Chew, Disp: , Rfl:   •  atorvastatin (LIPITOR) 40 mg tablet, Take 40 mg by mouth daily, Disp: , Rfl:   •  bimatoprost (LUMIGAN) 0 01 % ophthalmic drops, Apply to eye, Disp: , Rfl:   •  Calcium Carbonate-Vit D-Min (CALCIUM 1200 PO), Take 1,200 mg by mouth, Disp: , Rfl:   •  Cholecalciferol (VITAMIN D) 50 MCG (2000 UT) tablet, Take 2,000 Units by mouth daily, Disp: , Rfl:   •  clopidogrel (PLAVIX) 75 mg tablet, Take 75 mg by mouth daily, Disp: , Rfl:   •  cycloSPORINE (RESTASIS) 0 05 % ophthalmic emulsion, Administer 1 drop to both eyes 2 (two) times a day, Disp: , Rfl:   •  Empagliflozin 10 MG TABS, Take 10 mg by mouth every morning, Disp: , Rfl:   •  glimepiride (AMARYL) 4 mg tablet, Take 1 mg by mouth every morning before breakfast , Disp: , Rfl:   •  Insulin Degludec (TRESIBA SC), Inject 10 Units under the skin every morning, Disp: , Rfl:   •  linaGLIPtin 5 MG TABS, Take by mouth, Disp: , Rfl:   •  meclizine (ANTIVERT) 25 mg tablet, Take 25 mg by mouth as needed, Disp: , Rfl:   •  metoprolol tartrate (LOPRESSOR) 25 mg tablet, Take 25 mg by mouth daily at bedtime , Disp: , Rfl:   •  nitroglycerin (NITROSTAT) 0 4 mg SL tablet, Place 0 4 mg under the tongue every 5 (five) minutes as needed for chest pain, Disp: , Rfl:   •  pantoprazole (PROTONIX) 40 mg tablet, Take 40 mg by mouth daily, Disp: , Rfl:   •  ranolazine (RANEXA) 500 mg 12 hr tablet, Take 1,000 mg by mouth 2 (two) times a day , Disp: , Rfl:   •  repaglinide (PRANDIN) 1 mg tablet, Take 1 mg by mouth 3 (three) times a day before meals PRN, Disp: , Rfl:     Laboratory Results:        Invalid input(s): ALBUMIN    Results for orders placed or performed in visit on 11/18/22   PTH, Intact and Calcium   Result Value Ref Range    PTH, Intact 90 (H) 16 - 77 pg/mL    Calcium 10 1 8 6 - 10 4 mg/dL   Magnesium   Result Value Ref Range    Magnesium, Serum 2 3 1 5 - 2 5 mg/dL   Phosphorus   Result Value Ref Range    Phosphorus, Serum 4 5 (H) 2 1 - 4 3 mg/dL   Basic metabolic panel   Result Value Ref Range    Glucose, Random 131 (H) 65 - 99 mg/dL    BUN 30 (H) 7 - 25 mg/dL    Creatinine 1 24 (H) 0 60 - 1 00 mg/dL    eGFR 45 (L) > OR = 60 mL/min/1 73m2    SL AMB BUN/CREATININE RATIO 24 (H) 6 - 22 (calc)    Sodium 139 135 - 146 mmol/L    Potassium 5 4 (H) 3 5 - 5 3 mmol/L    Chloride 101 98 - 110 mmol/L    CO2 30 20 - 32 mmol/L    Calcium 10 1 8 6 - 10 4 mg/dL   CBC   Result Value Ref Range    White Blood Cell Count 8 4 3 8 - 10 8 Thousand/uL    Red Blood Cell Count 4 21 3 80 - 5 10 Million/uL    Hemoglobin 12 7 11 7 - 15 5 g/dL    HCT 38 0 35 0 - 45 0 %    MCV 90 3 80 0 - 100 0 fL    MCH 30 2 27 0 - 33 0 pg    MCHC 33 4 32 0 - 36 0 g/dL    RDW 14 1 11 0 - 15 0 %    Platelet Count 437 794 - 400 Thousand/uL    SL AMB MPV 9 2 7 5 - 12 5 fL   Urine Microscopic   Result Value Ref Range    SL AMB WBC, URINE NONE SEEN < OR = 5 /HPF    RBC, Urine NONE SEEN < OR = 2 /HPF    Squamous Epithelial Cells 0-5 < OR = 5 /HPF    Bacteria, UA NONE SEEN NONE SEEN /HPF    Hyaline Casts NONE SEEN NONE SEEN /LPF    Comment(s)     Protein, Total w/Creat, Random Urine   Result Value Ref Range    Creatinine, Urine 74 20 - 275 mg/dL    Protein/Creat Ratio 176 24 - 184 mg/g creat    Protein/Creat Ratio 0 176 0 024 - 0 184 mg/mg creat    Total Protein, Urine 13 5 - 24 mg/dL

## 2022-12-08 ENCOUNTER — HOSPITAL ENCOUNTER (OUTPATIENT)
Dept: RADIOLOGY | Facility: HOSPITAL | Age: 77
Discharge: HOME | End: 2022-12-08
Attending: INTERNAL MEDICINE

## 2022-12-08 ENCOUNTER — TELEPHONE (OUTPATIENT)
Dept: NEPHROLOGY | Facility: CLINIC | Age: 77
End: 2022-12-08

## 2022-12-08 DIAGNOSIS — N28.1 RENAL CYST: ICD-10-CM

## 2022-12-08 DIAGNOSIS — N18.31 STAGE 3A CHRONIC KIDNEY DISEASE (HCC): ICD-10-CM

## 2022-12-08 NOTE — TELEPHONE ENCOUNTER
Called patient to schedule fu (June) with Karan Titus in [facility] [place]  Patient said she would call back to schedule

## 2022-12-14 NOTE — PROGRESS NOTES
Spoke to the patient over the phone  Reviewed ultrasound findings  Patient is in [place] for the next 6 months  Patient splits time  I advised the patient to have a PCP ASAP and to call with the name of who the patient is going to see  Thereafter I can call the PCP and advised an urgent urology referral for evaluation  Patient will be having blood work soon also per prior recommendations    Patient is aware and understanding of the ultrasound findings and to find a PCP and to call

## 2023-01-19 ENCOUNTER — TELEPHONE (OUTPATIENT)
Dept: OTHER | Facility: HOSPITAL | Age: 78
End: 2023-01-19

## 2023-01-19 NOTE — TELEPHONE ENCOUNTER
Attempted to call the patient to follow-up on prior ultrasound and prior conversation to ensure that the patient has had made adequate appointment in [place]  Patient did  the phone but stated that she was busy and asked for a call back    I have sent a task request to the office

## 2023-01-20 ENCOUNTER — TELEPHONE (OUTPATIENT)
Dept: NEPHROLOGY | Facility: CLINIC | Age: 78
End: 2023-01-20

## 2023-01-20 NOTE — TELEPHONE ENCOUNTER
Left detailed message for pt, asked that she calls office to let us know if she has a urologist in [place] and if we need to forward any records

## 2023-01-20 NOTE — TELEPHONE ENCOUNTER
----- Message from Paris Jay MD sent at 1/19/2023  3:18 PM EST -----  Hello    Patient normally is followed up by Ms Sudeep Valencia  I spoke to the patient about this prior  Tried to call her today also but she was busy so she asked for a call back  Can you please reach the patient later today or tomorrow and please ask her if she has found a primary care physician there locally to follow-up the abnormal renal ultrasound findings  She likely needed a repeat ultrasound and/or referral to urology in [place] where she is currently living  She was aware to do this at that time but I wanted to make sure that she did not follow through      Thank you    np

## 2023-02-07 ENCOUNTER — TELEPHONE (OUTPATIENT)
Dept: OTHER | Facility: HOSPITAL | Age: 78
End: 2023-02-07

## 2023-02-07 NOTE — TELEPHONE ENCOUNTER
Spoke to the patient over the phone  I reviewed with the patient regarding high potassium  Patient states that she was eating higher potassium foods since she went to [place]  She has agreed to cut these foods out of her diet  I asked her for blood work this Thursday but she preferred to wait a couple weeks  I advised against this  She is agreeable to do blood work on Monday  I advised her to go to the ER or urgent care if she is not feeling well at any point for any reason  Patient was agreeable with the plan and stated understanding  I sent a message to the renal office to send the patient new lab slips  I also explained that it is difficult for me to manage patient's medical issues from a renal standpoint from [place] while she is in [place]  Patient has not found a PCP yet and stated she was waiting to call somebody  She has found a urologist who is managing the abnormal renal ultrasound findings  I advised the patient to find a nephrologist [facility]    Patient states that she has a book with phone numbers that she will call now

## 2023-02-08 ENCOUNTER — TELEPHONE (OUTPATIENT)
Dept: NEPHROLOGY | Facility: CLINIC | Age: 78
End: 2023-02-08

## 2023-02-08 NOTE — TELEPHONE ENCOUNTER
----- Message from Juan José Robles MD sent at 2/7/2023  3:09 PM EST -----  Hello    Patient normally is followed up by Ms Zuleyka Ramirez  Can you please send the patient a slip for BMP, magnesium, phosphorus  Please send it to her [place] address  If we are able to, can we please also send it to [facility] to see if they can uploaded so she can just have it done directly in [place] while she is there  She is going to go Monday  Please let the patient know when it is sent      Thank you    np

## 2023-02-09 ENCOUNTER — TELEPHONE (OUTPATIENT)
Dept: NEPHROLOGY | Facility: CLINIC | Age: 78
End: 2023-02-09

## 2023-02-09 NOTE — TELEPHONE ENCOUNTER
Called pt know to let her know that in order for us to get the labs and records from us to her new Kidney center in [place] she would to have her new center request the information from us  I gave her our fax number to give to her Kidney doctor in [place]

## 2023-02-20 ENCOUNTER — TELEPHONE (OUTPATIENT)
Dept: NEPHROLOGY | Facility: CLINIC | Age: 78
End: 2023-02-20

## 2023-02-20 NOTE — TELEPHONE ENCOUNTER
----- Message from Kiana Aguilera MD sent at 2/20/2023  7:36 AM EST -----  Hello    Patient normally is followed up by Ms Chaitanya Olivier  Can you please let the patient know that the potassium still remains borderline elevated  This needs further evaluation  Patient was to find a nephrologist and primary care physician where she is living in [place]  Please encourage the patient to see her physicians and if we can send them the lab slips  - For now can we please give the patient a prescription for Lokelma 5 g packet, take 1 pack every other day and repeat BMP and magnesium in 2 to 3 weeks, 3 refills  - patient will need to give us a local pharmacy where we can send a prescription  -But the main thing that the patient has to do is she needs to see her PCP and nephrologist and we need to send in the lab slips and they need to help address these issues as she lives in [place] not appear in [place]    Thank you    np    Left message for pt asking her to call office to go over lab results and recommendations

## 2023-07-10 ENCOUNTER — TELEPHONE (OUTPATIENT)
Dept: NEPHROLOGY | Facility: CLINIC | Age: 78
End: 2023-07-10

## 2023-07-10 NOTE — TELEPHONE ENCOUNTER
Spoke to pt- she is currently in [place] but very busy since she is in the process of permanently moving to [place]. She already has a PCP and urologist there. She will ask them to forward us info.

## 2023-07-10 NOTE — TELEPHONE ENCOUNTER
----- Message from Leti Castaneda MD sent at 7/10/2023  2:39 PM EDT -----  Hello    Patient normally is followed up by Ms Bridgett Uribe. Can you please check in with pt. Any update she has from PCP/nephro/urology given prior u/s findings. She is in [place]. She should have her physicians there send us their findings/office notes so we can coordinate her care when she is back locally.     Thank you    np

## 2024-08-20 ENCOUNTER — TELEPHONE (OUTPATIENT)
Age: 79
End: 2024-08-20

## 2024-08-20 NOTE — TELEPHONE ENCOUNTER
[place] [facility] called  they need the last office note and labs on the pt faxed to them at [phone].  Pt is now a pt of theirs.

## 2024-10-15 ENCOUNTER — APPOINTMENT (RX ONLY)
Dept: URBAN - METROPOLITAN AREA CLINIC 85 | Facility: CLINIC | Age: 79
Setting detail: DERMATOLOGY
End: 2024-10-15

## 2024-10-15 DIAGNOSIS — L57.8 OTHER SKIN CHANGES DUE TO CHRONIC EXPOSURE TO NONIONIZING RADIATION: ICD-10-CM

## 2024-10-15 DIAGNOSIS — B02.9 ZOSTER WITHOUT COMPLICATIONS: ICD-10-CM | Status: RESOLVING

## 2024-10-15 DIAGNOSIS — B35.8 OTHER DERMATOPHYTOSES: ICD-10-CM

## 2024-10-15 PROCEDURE — ? COUNSELING

## 2024-10-15 PROCEDURE — 99203 OFFICE O/P NEW LOW 30 MIN: CPT

## 2024-10-15 PROCEDURE — ? ADDITIONAL NOTES

## 2024-10-15 PROCEDURE — ? PRESCRIPTION MEDICATION MANAGEMENT

## 2024-10-15 PROCEDURE — ? PRESCRIPTION

## 2024-10-15 RX ORDER — TRIAMCINOLONE ACETONIDE 1 MG/G
CREAM TOPICAL
Qty: 80 | Refills: 0 | Status: ERX | COMMUNITY
Start: 2024-10-15

## 2024-10-15 RX ADMIN — TRIAMCINOLONE ACETONIDE: 1 CREAM TOPICAL at 00:00

## 2024-10-15 ASSESSMENT — LOCATION SIMPLE DESCRIPTION DERM
LOCATION SIMPLE: T4 LEFT POSTERIOR DERMATOME
LOCATION SIMPLE: LEFT FOREARM
LOCATION SIMPLE: T5 LEFT ANTERIOR DERMATOME
LOCATION SIMPLE: LEFT CHEEK
LOCATION SIMPLE: RIGHT FOREARM
LOCATION SIMPLE: RIGHT CHEEK

## 2024-10-15 ASSESSMENT — LOCATION DETAILED DESCRIPTION DERM
LOCATION DETAILED: T4 LEFT POSTERIOR DERMATOME
LOCATION DETAILED: LEFT INFERIOR CENTRAL MALAR CHEEK
LOCATION DETAILED: T5 LEFT ANTERIOR DERMATOME
LOCATION DETAILED: RIGHT PROXIMAL DORSAL FOREARM
LOCATION DETAILED: RIGHT INFERIOR CENTRAL MALAR CHEEK
LOCATION DETAILED: LEFT MEDIAL BUCCAL CHEEK
LOCATION DETAILED: LEFT PROXIMAL DORSAL FOREARM

## 2024-10-15 ASSESSMENT — LOCATION ZONE DERM
LOCATION ZONE: ARM
LOCATION ZONE: FACE
LOCATION ZONE: TRUNK

## 2024-10-15 ASSESSMENT — PAIN INTENSITY VAS: HOW INTENSE IS YOUR PAIN 0 BEING NO PAIN, 10 BEING THE MOST SEVERE PAIN POSSIBLE?: NO PAIN

## 2024-10-15 ASSESSMENT — SEVERITY ASSESSMENT: SEVERITY: MILD

## 2024-11-08 ENCOUNTER — APPOINTMENT (RX ONLY)
Dept: URBAN - METROPOLITAN AREA CLINIC 84 | Facility: CLINIC | Age: 79
Setting detail: DERMATOLOGY
End: 2024-11-08

## 2024-11-08 DIAGNOSIS — L65.9 NONSCARRING HAIR LOSS, UNSPECIFIED: ICD-10-CM

## 2024-11-08 DIAGNOSIS — L82.1 OTHER SEBORRHEIC KERATOSIS: ICD-10-CM

## 2024-11-08 DIAGNOSIS — L21.8 OTHER SEBORRHEIC DERMATITIS: ICD-10-CM

## 2024-11-08 DIAGNOSIS — L85.3 XEROSIS CUTIS: ICD-10-CM | Status: INADEQUATELY CONTROLLED

## 2024-11-08 DIAGNOSIS — L81.4 OTHER MELANIN HYPERPIGMENTATION: ICD-10-CM

## 2024-11-08 DIAGNOSIS — D18.0 HEMANGIOMA: ICD-10-CM

## 2024-11-08 DIAGNOSIS — D22 MELANOCYTIC NEVI: ICD-10-CM

## 2024-11-08 DIAGNOSIS — L57.8 OTHER SKIN CHANGES DUE TO CHRONIC EXPOSURE TO NONIONIZING RADIATION: ICD-10-CM | Status: INADEQUATELY CONTROLLED

## 2024-11-08 DIAGNOSIS — L82.0 INFLAMED SEBORRHEIC KERATOSIS: ICD-10-CM

## 2024-11-08 DIAGNOSIS — H61.03 CHONDRITIS OF EXTERNAL EAR: ICD-10-CM | Status: INADEQUATELY CONTROLLED

## 2024-11-08 DIAGNOSIS — Z71.89 OTHER SPECIFIED COUNSELING: ICD-10-CM

## 2024-11-08 PROBLEM — D18.01 HEMANGIOMA OF SKIN AND SUBCUTANEOUS TISSUE: Status: ACTIVE | Noted: 2024-11-08

## 2024-11-08 PROBLEM — D22.5 MELANOCYTIC NEVI OF TRUNK: Status: ACTIVE | Noted: 2024-11-08

## 2024-11-08 PROBLEM — H61.033 CHONDRITIS OF EXTERNAL EAR, BILATERAL: Status: ACTIVE | Noted: 2024-11-08

## 2024-11-08 PROCEDURE — ? PRESCRIPTION SAMPLES PROVIDED

## 2024-11-08 PROCEDURE — ? SUNSCREEN RECOMMENDATIONS

## 2024-11-08 PROCEDURE — ? ADDITIONAL NOTES

## 2024-11-08 PROCEDURE — ? COUNSELING

## 2024-11-08 PROCEDURE — ? SUNSCREEN TREATMENT REGIMEN

## 2024-11-08 PROCEDURE — 99214 OFFICE O/P EST MOD 30 MIN: CPT

## 2024-11-08 PROCEDURE — ? PRESCRIPTION MEDICATION MANAGEMENT

## 2024-11-08 PROCEDURE — ? PHOTO-DOCUMENTATION

## 2024-11-08 PROCEDURE — ? PRESCRIPTION

## 2024-11-08 RX ORDER — CLOBETASOL PROPIONATE 0.5 MG/ML
SOLUTION TOPICAL
Qty: 50 | Refills: 6 | Status: ERX | COMMUNITY
Start: 2024-11-08

## 2024-11-08 RX ADMIN — CLOBETASOL PROPIONATE: 0.5 SOLUTION TOPICAL at 00:00

## 2024-11-08 ASSESSMENT — LOCATION SIMPLE DESCRIPTION DERM
LOCATION SIMPLE: LEFT THIGH
LOCATION SIMPLE: POSTERIOR NECK
LOCATION SIMPLE: RIGHT CHEEK
LOCATION SIMPLE: LEFT LOWER BACK
LOCATION SIMPLE: ABDOMEN
LOCATION SIMPLE: RIGHT ANTERIOR NECK
LOCATION SIMPLE: RIGHT SHOULDER
LOCATION SIMPLE: RIGHT FOREHEAD
LOCATION SIMPLE: LEFT BREAST
LOCATION SIMPLE: LEFT ANTERIOR NECK
LOCATION SIMPLE: RIGHT HAND
LOCATION SIMPLE: LEFT UPPER BACK
LOCATION SIMPLE: CHEST
LOCATION SIMPLE: RIGHT FOREARM
LOCATION SIMPLE: RIGHT BACK
LOCATION SIMPLE: RIGHT POSTERIOR UPPER ARM
LOCATION SIMPLE: RIGHT PRETIBIAL REGION
LOCATION SIMPLE: LEFT FOREHEAD
LOCATION SIMPLE: RIGHT EAR
LOCATION SIMPLE: RIGHT UPPER BACK
LOCATION SIMPLE: SCALP
LOCATION SIMPLE: LEFT HAND
LOCATION SIMPLE: LEFT CHEEK
LOCATION SIMPLE: LEFT FOREARM
LOCATION SIMPLE: RIGHT UPPER ARM
LOCATION SIMPLE: LEFT SHOULDER
LOCATION SIMPLE: LEFT EAR
LOCATION SIMPLE: LEFT PRETIBIAL REGION

## 2024-11-08 ASSESSMENT — LOCATION DETAILED DESCRIPTION DERM
LOCATION DETAILED: RIGHT INFERIOR UPPER BACK
LOCATION DETAILED: LEFT POSTERIOR SHOULDER
LOCATION DETAILED: RIGHT DISTAL DORSAL FOREARM
LOCATION DETAILED: RIGHT POSTERIOR SHOULDER
LOCATION DETAILED: LEFT ANTIHELIX
LOCATION DETAILED: RIGHT DISTAL PRETIBIAL REGION
LOCATION DETAILED: RIGHT DISTAL POSTERIOR UPPER ARM
LOCATION DETAILED: LEFT RADIAL DORSAL HAND
LOCATION DETAILED: RIGHT CLAVICULAR NECK
LOCATION DETAILED: LEFT INFERIOR FOREHEAD
LOCATION DETAILED: LEFT LATERAL ABDOMEN
LOCATION DETAILED: LEFT PROXIMAL DORSAL FOREARM
LOCATION DETAILED: RIGHT INFERIOR CENTRAL MALAR CHEEK
LOCATION DETAILED: LEFT CENTRAL MALAR CHEEK
LOCATION DETAILED: RIGHT LATERAL ABDOMEN
LOCATION DETAILED: RIGHT SUPERIOR LATERAL UPPER BACK
LOCATION DETAILED: RIGHT SCAPHA
LOCATION DETAILED: RIGHT INFERIOR FOREHEAD
LOCATION DETAILED: RIGHT INFERIOR ANTERIOR NECK
LOCATION DETAILED: LEFT MEDIAL SUPERIOR CHEST
LOCATION DETAILED: LEFT DISTAL DORSAL FOREARM
LOCATION DETAILED: SUBXIPHOID
LOCATION DETAILED: LEFT MID-UPPER BACK
LOCATION DETAILED: LEFT SUPERIOR LATERAL MIDBACK
LOCATION DETAILED: LEFT SUPERIOR CENTRAL BUCCAL CHEEK
LOCATION DETAILED: RIGHT ANTERIOR DISTAL UPPER ARM
LOCATION DETAILED: LEFT DISTAL PRETIBIAL REGION
LOCATION DETAILED: RIGHT MID-UPPER BACK
LOCATION DETAILED: RIGHT PROXIMAL PRETIBIAL REGION
LOCATION DETAILED: LEFT SUPERIOR UPPER BACK
LOCATION DETAILED: RIGHT CENTRAL MALAR CHEEK
LOCATION DETAILED: LEFT INFERIOR ANTERIOR NECK
LOCATION DETAILED: LEFT INFERIOR CENTRAL MALAR CHEEK
LOCATION DETAILED: LEFT ANTERIOR DISTAL THIGH
LOCATION DETAILED: MID POSTERIOR NECK
LOCATION DETAILED: RIGHT FOREHEAD
LOCATION DETAILED: RIGHT RADIAL DORSAL HAND
LOCATION DETAILED: LEFT MEDIAL BREAST 6-7:00 REGION
LOCATION DETAILED: LEFT MEDIAL FOREHEAD
LOCATION DETAILED: RIGHT SUPERIOR UPPER BACK
LOCATION DETAILED: LEFT SUPERIOR PARIETAL SCALP
LOCATION DETAILED: RIGHT PROXIMAL DORSAL FOREARM

## 2024-11-08 ASSESSMENT — LOCATION ZONE DERM
LOCATION ZONE: EAR
LOCATION ZONE: SCALP
LOCATION ZONE: TRUNK
LOCATION ZONE: LEG
LOCATION ZONE: ARM
LOCATION ZONE: NECK
LOCATION ZONE: HAND
LOCATION ZONE: FACE

## 2024-11-08 NOTE — PROCEDURE: PRESCRIPTION MEDICATION MANAGEMENT
Detail Level: Detailed
Initiate Treatment: Suggested options of: Cerave/amlactin/eucerin/cetaphil cream daily after showers. Gentle cleansers discussed like cetaphil vs vanicream. Basic skin care reviewed. Suggested increasing water intake as well.
Render In Strict Bullet Format?: No
Initiate Treatment: clobetasol 0.05 % scalp solution \\nQuantity: 50.0 ml\\nSig: Apply 2-3 drops to scalp nightly 2-3 times a week
Detail Level: Zone

## 2024-11-08 NOTE — PROCEDURE: ADDITIONAL NOTES
Additional Notes: Patient states she’s had a biopsy done to rule out the hair loss. Was done in [place] a few years ago.
Detail Level: Simple
Render Risk Assessment In Note?: no
Additional Notes: Written bloodwork order given to patient today in office, photo in chart. \\nPatient to use native or Nutrafol shampoo and conditioner
Additional Notes: Biopsy in reserve
Additional Notes: Will hold off on LN2 until follow up due to upcoming knee surgery

## 2025-03-25 ENCOUNTER — APPOINTMENT (OUTPATIENT)
Dept: URBAN - METROPOLITAN AREA CLINIC 85 | Facility: CLINIC | Age: 80
Setting detail: DERMATOLOGY
End: 2025-03-25

## 2025-03-25 DIAGNOSIS — D49.2 NEOPLASM OF UNSPECIFIED BEHAVIOR OF BONE, SOFT TISSUE, AND SKIN: ICD-10-CM | Status: INADEQUATELY CONTROLLED

## 2025-03-25 DIAGNOSIS — L81.0 POSTINFLAMMATORY HYPERPIGMENTATION: ICD-10-CM

## 2025-03-25 PROCEDURE — 99212 OFFICE O/P EST SF 10 MIN: CPT | Mod: 25

## 2025-03-25 PROCEDURE — ? DIAGNOSIS COMMENT

## 2025-03-25 PROCEDURE — ? BIOPSY BY SHAVE METHOD

## 2025-03-25 PROCEDURE — 11102 TANGNTL BX SKIN SINGLE LES: CPT

## 2025-03-25 PROCEDURE — ? COUNSELING

## 2025-03-25 ASSESSMENT — LOCATION DETAILED DESCRIPTION DERM
LOCATION DETAILED: LEFT LATERAL BREAST 5-6:00 REGION
LOCATION DETAILED: LEFT RIB CAGE

## 2025-03-25 ASSESSMENT — LOCATION ZONE DERM: LOCATION ZONE: TRUNK

## 2025-03-25 ASSESSMENT — LOCATION SIMPLE DESCRIPTION DERM
LOCATION SIMPLE: ABDOMEN
LOCATION SIMPLE: LEFT BREAST

## 2025-03-25 NOTE — PROCEDURE: BIOPSY BY SHAVE METHOD
Body Location Override (Optional - Billing Will Still Be Based On Selected Body Map Location If Applicable): left lateral breast 5-6 region
Detail Level: Detailed
Depth Of Biopsy: dermis
Was A Bandage Applied: Yes
Size Of Lesion In Cm: 0
Biopsy Type: H and E
Biopsy Method: Personna blade
Anesthesia Type: 1% lidocaine without epinephrine
Anesthesia Volume In Cc: 0.5
Hemostasis: Aluminum Chloride
Wound Care: Aquaphor
Dressing: pressure dressing with telfa
Destruction After The Procedure: No
Type Of Destruction Used: Curettage
Curettage Text: The wound bed was treated with curettage after the biopsy was performed.
Cryotherapy Text: The wound bed was treated with cryotherapy after the biopsy was performed.
Electrodesiccation Text: The wound bed was treated with electrodesiccation after the biopsy was performed.
Electrodesiccation And Curettage Text: The wound bed was treated with electrodesiccation and curettage after the biopsy was performed.
Silver Nitrate Text: The wound bed was treated with silver nitrate after the biopsy was performed.
Lab: -[id number]
Medical Necessity Information: It is in your best interest to select a reason for this procedure from the list below. All of these items fulfill various CMS LCD requirements except the new and changing color options.
Consent: Written consent was obtained and risks were reviewed including but not limited to scarring, infection, bleeding, scabbing, incomplete removal, nerve damage and allergy to anesthesia.
Post-Care Instructions: I reviewed with the patient in detail post-care instructions. Patient is to keep the biopsy site dry overnight, and then apply bacitracin twice daily until healed. Patient may apply hydrogen peroxide soaks to remove any crusting.
Notification Instructions: Patient will be notified of biopsy results. However, patient instructed to call the office if not contacted within 2 weeks.
Billing Type: Third-Party Bill
Information: Selecting Yes will display possible errors in your note based on the variables you have selected. This validation is only offered as a suggestion for you. PLEASE NOTE THAT THE VALIDATION TEXT WILL BE REMOVED WHEN YOU FINALIZE YOUR NOTE. IF YOU WANT TO FAX A PRELIMINARY NOTE YOU WILL NEED TO TOGGLE THIS TO 'NO' IF YOU DO NOT WANT IT IN YOUR FAXED NOTE.

## 2025-04-10 ENCOUNTER — APPOINTMENT (OUTPATIENT)
Dept: URBAN - METROPOLITAN AREA CLINIC 85 | Facility: CLINIC | Age: 80
Setting detail: DERMATOLOGY
End: 2025-04-10

## 2025-04-10 DIAGNOSIS — Z41.9 ENCOUNTER FOR PROCEDURE FOR PURPOSES OTHER THAN REMEDYING HEALTH STATE, UNSPECIFIED: ICD-10-CM

## 2025-04-10 DIAGNOSIS — L90.8 OTHER ATROPHIC DISORDERS OF SKIN: ICD-10-CM

## 2025-04-10 PROCEDURE — ? ADDITIONAL NOTES

## 2025-04-10 PROCEDURE — ? TREATMENT REGIMEN

## 2025-04-10 PROCEDURE — ? COSMETIC CONSULTATION: BOTOX

## 2025-04-10 PROCEDURE — ? COUNSELING

## 2025-04-10 PROCEDURE — ? COSMETIC CONSULTATION: FILLERS

## 2025-04-10 ASSESSMENT — LOCATION SIMPLE DESCRIPTION DERM
LOCATION SIMPLE: RIGHT TEMPLE
LOCATION SIMPLE: LEFT EYELID

## 2025-04-10 ASSESSMENT — LOCATION ZONE DERM
LOCATION ZONE: FACE
LOCATION ZONE: EYELID

## 2025-04-10 ASSESSMENT — LOCATION DETAILED DESCRIPTION DERM
LOCATION DETAILED: RIGHT INFERIOR TEMPLE
LOCATION DETAILED: LEFT LATERAL CANTHUS

## 2025-04-10 NOTE — PROCEDURE: ADDITIONAL NOTES
Render Risk Assessment In Note?: no
Additional Notes: Pt would need Botox to help with crows feet.  Her lower face issues would need surgery to bring things back to where she would like.  Pt given surgeon name and info.  Pt told she would need fillers for the areas by her nose down to top of lips.  Pt given information on fillers and how much would be needed and the cost.  Pt to.d how long each procedure would last.  Given info on sulphate and contact info.  Given info on Better science products for lines on face.
Detail Level: Simple

## 2025-04-10 NOTE — PROCEDURE: TREATMENT REGIMEN
Action 2: Continue
Show Eltamd Line: Yes
Detail Level: Zone
Plan: Skin better science lines, applied 1st thing after washing face to deepest lines. Alpharet nightly to entire face.